# Patient Record
Sex: MALE | Race: ASIAN | Employment: FULL TIME | URBAN - METROPOLITAN AREA
[De-identification: names, ages, dates, MRNs, and addresses within clinical notes are randomized per-mention and may not be internally consistent; named-entity substitution may affect disease eponyms.]

---

## 2017-02-20 ENCOUNTER — HOSPITAL ENCOUNTER (EMERGENCY)
Facility: CLINIC | Age: 37
Discharge: HOME | End: 2017-02-20
Attending: EMERGENCY MEDICINE | Admitting: EMERGENCY MEDICINE
Payer: OTHER MISCELLANEOUS

## 2017-02-20 VITALS
SYSTOLIC BLOOD PRESSURE: 185 MMHG | TEMPERATURE: 99 F | WEIGHT: 197.53 LBS | RESPIRATION RATE: 16 BRPM | BODY MASS INDEX: 30.03 KG/M2 | OXYGEN SATURATION: 100 % | DIASTOLIC BLOOD PRESSURE: 110 MMHG
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
## 2017-02-20 DIAGNOSIS — S81.851A DOG BITE OF RIGHT LOWER LEG, INITIAL ENCOUNTER: ICD-10-CM

## 2017-02-20 DIAGNOSIS — S41.151A DOG BITE OF ARM, RIGHT, INITIAL ENCOUNTER: ICD-10-CM

## 2017-02-20 PROCEDURE — 99282 EMERGENCY DEPT VISIT SF MDM: CPT

## 2017-02-20 ASSESSMENT — ENCOUNTER SYMPTOMS: WOUND: 1

## 2017-02-20 NOTE — ED AVS SNAPSHOT
[facility] Emergency Department    [address]    [facility] [address]    Phone:  [phone]    Fax:  [phone]                                       Valentino Santos   MRN: [id number]    Department:  [facility] Emergency Department   Date of Visit:  2/20/2017           Patient Information     Date Of Birth          1980        Your diagnoses for this visit were:     Dog bite of arm, right, initial encounter     Dog bite of right lower leg, initial encounter        You were seen by Daniel Stevenson MD.      Follow-up Information     Follow up with [facility] Emergency Department.    Specialty:  EMERGENCY MEDICINE    Why:  As needed, If symptoms worsen    Contact information:    [address]  [facility] [address] [phone]        Discharge Instructions         If you can verify dog has been vaccinated, you do not need rabies series. If the dog hasn't been vaccinated, you still don't necessarily need the rabies series if the dog is quarantined and watched. You would have to contact the police for that to happen. You have up to a week to get vaccinated, though sooner is better if it's necessary, though we believe you are very low risk as we discussed.     Keep antibiotic ointment over your wounds. No need for oral antibiotics unless you show signs of infection. (fever, spreading redness, white discharge, increased pain). Return to ED for rabies series if you want, or for signs of infection. Otherwise you may follow-up routinely with your normal doctor.         Dog Bite  A dog bite can cause a wound deep enough to break the skin. In such cases, the wound is cleaned and then closed. Sometimes, the wound is not closed completely. This is so that fluid can drain if the wound becomes infected. In addition to wound care, a tetanus shot may be given, if needed.    Home Care    Wash your hands well with soap and warm water before and  after caring for the wound. This helps lower the risk of infection.    Care for the wound as directed. If a dressing was applied to the wound, be sure to change it as directed.    If the wound bleeds, place a clean, soft cloth on the wound. Then firmly apply pressure until the bleeding stops. This may take up to 5 minutes. Do not release the pressure and look at the wound during this time.    Most wounds heal within 10 days. But an infection can occur even with proper treatment. So be sure to check the wound daily for signs of infection (see below).    Antibiotics may be prescribed. These help prevent or treat infection. If you re given antibiotics, take them as directed. Also be sure to complete the medications.  Rabies Prevention   Rabies is a virus that can be carried in certain animals. These can include domestic animals such as dogs and cats. Pets fully vaccinated against rabies (2 shots) are at very low risk of infection. But because human rabies is almost always fatal, any biting pet should be confined for 10 days as an extra precaution. In general, if there is a risk for rabies, the following steps may need to be taken:    If someone s pet dog has bitten you, it should be kept in a secure area for the next 10 days to watch for signs of illness. (If the pet owner won t allow this, contact your local animal control center.) If the dog becomes ill or dies during that time, contact your local animal control center at once so the animal may be tested for rabies. If the dog stays healthy for the next 10 days, there is no danger of rabies in the animal or you.    If a stray dog bit you, contact your local animal control center. They can give information on capture, quarantine, and animal rabies testing.    If you can t locate the animal that bit you in the next 2 days, and if rabies exists in your region, you may need to receive the rabies vaccine series. Call your health care provider right away. Or, return to the  emergency department promptly.    All animal bites should be reported to the local animal control center. If you were not given a form to fill out, you can report this yourself.  Follow-up care  Follow up with your health care provider, or as directed.   When to seek medical advice  Call your health care provider right away if any of these occur:    Signs of infection:    Spreading redness or warmth from the wound    Increased pain or swelling    Fever of 100.4 F (38 C) or higher, or as directed by your health care provider    Colored fluid or pus draining from the wound    Signs of rabies infection:    Headache    Confusion    Strange behavior    Seizure    Decreased ability to move any body part near the wound    Bleeding that cannot be stopped after 5 minutes of firm pressure    4139-0692 [employer]. [address] [place] [address], [place] [address]. All rights reserved. This information is not intended as a substitute for professional medical care. Always follow your healthcare professional's instructions.          24 Hour Appointment Hotline       To make an appointment at any [facility] (formerly [facility]), call [phone] ([phone]). If you don't have a family doctor or clinic, we will help you find one. [facility] clinics are conveniently located to serve the needs of you and your family.             Review of your medicines      Notice     You have not been prescribed any medications.            Orders Needing Specimen Collection     None      Pending Results     No orders found from 2/18/2017 to 2/21/2017.            Pending Culture Results     No orders found from 2/18/2017 to 2/21/2017.             Test Results from your hospital stay            Clinical Quality Measure: Blood Pressure Screening     Your blood pressure was checked while you were in the emergency department today. The last reading we obtained was  BP: (!) 185/110 . Please read the guidelines below about what these numbers mean and what  "you should do about them.  If your systolic blood pressure (the top number) is less than 120 and your diastolic blood pressure (the bottom number) is less than 80, then your blood pressure is normal. There is nothing more that you need to do about it.  If your systolic blood pressure (the top number) is 120-139 or your diastolic blood pressure (the bottom number) is 80-89, your blood pressure may be higher than it should be. You should have your blood pressure rechecked within a year by a primary care provider.  If your systolic blood pressure (the top number) is 140 or greater or your diastolic blood pressure (the bottom number) is 90 or greater, you may have high blood pressure. High blood pressure is treatable, but if left untreated over time it can put you at risk for heart attack, stroke, or kidney failure. You should have your blood pressure rechecked by a primary care provider within the next 4 weeks.  If your provider in the emergency department today gave you specific instructions to follow-up with your doctor or provider even sooner than that, you should follow that instruction and not wait for up to 4 weeks for your follow-up visit.        Thank you for choosing [facility]       Thank you for choosing [facility] for your care. Our goal is always to provide you with excellent care. Hearing back from our patients is one way we can continue to improve our services. Please take a few minutes to complete the written survey that you may receive in the mail after you visit with us. Thank you!        [employer] Information     [employer] lets you send messages to your doctor, view your test results, renew your prescriptions, schedule appointments and more. To sign up, go to [email or web address] . Click on \"Log in\" on the left side of the screen, which will take you to the Welcome page. Then click on \"Sign up Now\" on the right side of the page.     You will be asked to enter the access code listed below, as well as some " personal information. Please follow the directions to create your username and password.     Your access code is: [email or web address]  Expires: 2017  8:43 PM     Your access code will  in 90 days. If you need help or a new code, please call your [facility] clinic or [phone].        Care EveryWhere ID     This is your Care EveryWhere ID. This could be used by other organizations to access your [facility] medical records  [id number]        After Visit Summary       This is your record. Keep this with you and show to your community pharmacist(s) and doctor(s) at your next visit.

## 2017-02-20 NOTE — ED AVS SNAPSHOT
[facility] Emergency Department    [address]    [facility] [address]    Phone:  [phone]    Fax:  [phone]                                       Valentino Santos   MRN: [id number]    Department:  [facility] Emergency Department   Date of Visit:  2/20/2017           After Visit Summary Signature Page     I have received my discharge instructions, and my questions have been answered. I have discussed any challenges I see with this plan with the nurse or doctor.    ..........................................................................................................................................  Patient/Patient Representative Signature      ..........................................................................................................................................  Patient Representative Print Name and Relationship to Patient    ..................................................               ................................................  Date                                            Time    ..........................................................................................................................................  Reviewed by Signature/Title    ...................................................              ..............................................  Date                                                            Time

## 2017-02-21 NOTE — DISCHARGE INSTRUCTIONS
If you can verify dog has been vaccinated, you do not need rabies series. If the dog hasn't been vaccinated, you still don't necessarily need the rabies series if the dog is quarantined and watched. You would have to contact the police for that to happen. You have up to a week to get vaccinated, though sooner is better if it's necessary, though we believe you are very low risk as we discussed.     Keep antibiotic ointment over your wounds. No need for oral antibiotics unless you show signs of infection. (fever, spreading redness, white discharge, increased pain). Return to ED for rabies series if you want, or for signs of infection. Otherwise you may follow-up routinely with your normal doctor.         Dog Bite  A dog bite can cause a wound deep enough to break the skin. In such cases, the wound is cleaned and then closed. Sometimes, the wound is not closed completely. This is so that fluid can drain if the wound becomes infected. In addition to wound care, a tetanus shot may be given, if needed.    Home Care    Wash your hands well with soap and warm water before and after caring for the wound. This helps lower the risk of infection.    Care for the wound as directed. If a dressing was applied to the wound, be sure to change it as directed.    If the wound bleeds, place a clean, soft cloth on the wound. Then firmly apply pressure until the bleeding stops. This may take up to 5 minutes. Do not release the pressure and look at the wound during this time.    Most wounds heal within 10 days. But an infection can occur even with proper treatment. So be sure to check the wound daily for signs of infection (see below).    Antibiotics may be prescribed. These help prevent or treat infection. If you re given antibiotics, take them as directed. Also be sure to complete the medications.  Rabies Prevention   Rabies is a virus that can be carried in certain animals. These can include domestic animals such as dogs and cats.  Pets fully vaccinated against rabies (2 shots) are at very low risk of infection. But because human rabies is almost always fatal, any biting pet should be confined for 10 days as an extra precaution. In general, if there is a risk for rabies, the following steps may need to be taken:    If someone s pet dog has bitten you, it should be kept in a secure area for the next 10 days to watch for signs of illness. (If the pet owner won t allow this, contact your local animal control center.) If the dog becomes ill or dies during that time, contact your local animal control center at once so the animal may be tested for rabies. If the dog stays healthy for the next 10 days, there is no danger of rabies in the animal or you.    If a stray dog bit you, contact your local animal control center. They can give information on capture, quarantine, and animal rabies testing.    If you can t locate the animal that bit you in the next 2 days, and if rabies exists in your region, you may need to receive the rabies vaccine series. Call your health care provider right away. Or, return to the emergency department promptly.    All animal bites should be reported to the local animal control center. If you were not given a form to fill out, you can report this yourself.  Follow-up care  Follow up with your health care provider, or as directed.   When to seek medical advice  Call your health care provider right away if any of these occur:    Signs of infection:    Spreading redness or warmth from the wound    Increased pain or swelling    Fever of 100.4 F (38 C) or higher, or as directed by your health care provider    Colored fluid or pus draining from the wound    Signs of rabies infection:    Headache    Confusion    Strange behavior    Seizure    Decreased ability to move any body part near the wound    Bleeding that cannot be stopped after 5 minutes of firm pressure    5315-6799 [employer]. [address] [facility],  [place] [address]. All rights reserved. This information is not intended as a substitute for professional medical care. Always follow your healthcare professional's instructions.

## 2017-02-21 NOTE — ED NOTES
Patient arrives to ED due to dog bite . Reports getting bitten  On R arm, did not report incident to PD, went to [facility] and was told to be seen in ED for further evaluation. Bleeding is controlled .

## 2017-02-21 NOTE — ED PROVIDER NOTES
History     Chief Complaint:  Dog Bite      HPI   Valentino Santos is a 37 year old male who presents with a dog bite. 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 When the patient was handing the homework to the student's dad, a dog jumped out from behind the door and bit the patient's right forearm. He also sustained an abrasion to his right leg. The patient states that the student had two dogs and he is unsure which dog bit him. The patient states that he was provided the vaccination records for one of dogs. He was seen at [facility] and was told to come to the ED for further evaluation.         Allergies:  No known drug allergies.     Medications:    The patient is currently on no regular medications.      Past Medical History:    HTN  Depression    Past Surgical History:    History reviewed. No pertinent past surgical history.     Family History:    History reviewed. No pertinent family history.     Social History:  Marital Status:   Presents to the ED alone   Tobacco Use: Former Smoker, quit 2012  Alcohol Use: No     Review of Systems   Skin: Positive for wound.   All other systems reviewed and are negative.    Physical Exam   First Vitals:  BP: (!) 185/110  Heart Rate: 87  Temp: 99  F (37.2  C)  Resp: 16  Weight: 89.6 kg (197 lb 8.5 oz)  SpO2: 100 %    Physical Exam  Nursing note and vitals reviewed.  Constitutional: Cooperative.   HENT:   Mouth/Throat: Moist mucous membranes.   Eyes: EOMI, nonicteric sclera  Cardiovascular: Normal rate, regular rhythm, no murmurs, rubs, or gallops  Pulmonary/Chest: Effort normal and breath sounds normal. No respiratory distress. No wheezes. No rales.   Abdominal: Soft. Nontender, nondistended, no guarding or rigidity. BS present.   Musculoskeletal: Normal range of motion.   Neurological: Alert. Moves all extremities spontaneously.   Skin: Skin is warm and dry. No rash noted. Abrasions to right arm  and right leg. Clothing inspected. No puncture wounds in clothes or skin.   Psychiatric: Normal mood and affect.       Emergency Department Course   Emergency Department Course:  Nursing notes and vitals reviewed.  I performed an exam of the patient as documented above.   Findings and plan explained to the patient. Patient discharged home with instructions regarding supportive care, medications, and reasons to return. The importance of close follow-up was reviewed.    Impression & Plan      Medical Decision Making:  Valentino Santos is a 37 year old male who presents for evaluation of a dog bite to his right forearm and right lower leg.  The workup here in the ED shows no signs of compartment syndrome, significant lacerations, tendon or bone injury.  No signs of foreign body or dog teeth in wound. Actually, dog didn't break pt's clothing and there are no puncture wounds. Dog is known so will have them observe for signs of rabies; no rabies shots indicated from ED.   Will have them observe for signs of infection (pain, redness, warmth, red streaks, etc).   Pt has antibx from urgent care that I recommended that he not start taking unless he shows any signs of infection. He is in stable condition at the time of discharge, indications for return to the ED were discussed as well as follow up. All questions were answered and he is in agreement with the plan.      Diagnosis:    ICD-10-CM    1. Dog bite of arm, right, initial encounter S41.151A     W54.0XXA    2. Dog bite of right lower leg, initial encounter S81.851A     W54.0XXA        Disposition:  discharged to home      I, Judith Tom, am serving as a scribe on 2/20/2017 at 8:03 PM to personally document services performed by Dr. Stevenson based on my observations and the provider's statements to me.    2/20/2017   [facility] EMERGENCY DEPARTMENT       Daniel Stevenson MD  02/25/17 1811

## 2018-12-11 ENCOUNTER — OFFICE VISIT (OUTPATIENT)
Dept: FAMILY MEDICINE | Facility: CLINIC | Age: 38
End: 2018-12-11
Payer: COMMERCIAL

## 2018-12-11 VITALS
RESPIRATION RATE: 12 BRPM | TEMPERATURE: 98.1 F | HEIGHT: 68 IN | WEIGHT: 208 LBS | DIASTOLIC BLOOD PRESSURE: 133 MMHG | BODY MASS INDEX: 31.52 KG/M2 | HEART RATE: 96 BPM | SYSTOLIC BLOOD PRESSURE: 187 MMHG

## 2018-12-11 DIAGNOSIS — I10 BENIGN ESSENTIAL HYPERTENSION: ICD-10-CM

## 2018-12-11 DIAGNOSIS — L72.3 SEBACEOUS CYST: Primary | ICD-10-CM

## 2018-12-11 PROCEDURE — 99204 OFFICE O/P NEW MOD 45 MIN: CPT | Performed by: FAMILY MEDICINE

## 2018-12-11 RX ORDER — LISINOPRIL AND HYDROCHLOROTHIAZIDE 20; 25 MG/1; MG/1
1 TABLET ORAL DAILY
Qty: 30 TABLET | Refills: 0 | Status: SHIPPED | OUTPATIENT
Start: 2018-12-11 | End: 2019-01-09

## 2018-12-11 ASSESSMENT — MIFFLIN-ST. JEOR: SCORE: 1831.95

## 2018-12-11 NOTE — PATIENT INSTRUCTIONS
Patient Education   Follow up with general surgery for cyst removal     Epidermoid Cyst (Sebaceous Cyst), No Infection  An epidermoid cyst (sebaceous cyst) is a term that refers to 2 similar types of cysts: those found in the skin (epidermoid), and those found around hair follicles (pilar).  Some general facts about these cysts:    A cyst is a sac filled with material that is often cheesy, fatty, oily, or fibrous. The material in them can be thick (like cottage cheese) or liquid.    They form slowly under the skin, and can be found on most parts of the body. They are most often found in hairier areas like the scalp, face, upper back, and genitals.    You can usually move them slightly if you try.    They can be smaller than a pea or as large as a few inches.    They are usually not painful, unless they become inflamed or infected.    The area around the cyst may smell bad. If the cyst breaks open, the material inside it often smells bad too.  Causes  Epidermoid cysts are caused when skin (epidermal) cells move under the skin surface, or are covered over by it. These cells continue to multiply, like skin does normally. They then form a wall around themselves (cyst) and secrete normal skin fluids (keratin). This may be developmental. But it often happens because of an injury to the skin.  Epidermoid cysts are often found around hair follicles. These follicles are like cysts, but they have openings. Normal lubricating oils for your hair are sent out through these openings. A cyst occurs when an opening becomes blocked or the site inflamed. This often occurs when there is damage to the hair follicles by a scrape or wound.    Pilar cysts are similar to epidermoid cysts. But they start from a different part of the hair follicle, and are more likely to be on the scalp.  Symptoms    Feeling a lump just beneath the skin    It may or may not be painful    The cyst may or may not smell bad    The cyst may become inflamed or  red    The cyst may leak fluid or thick material  Home care  Epidermoid cysts often go away without any treatment. If your cyst doesn t go away, and it bothers you, it may be drained or removed. If the cyst drains on its own, it may return. Resist the temptation to squeeze, pop, stick a needle in it, or cut it open. This often leads to an infection and scarring. If it gets severely inflamed or infected, you should seek medical care. Be sure to clean the cyst area when bathing or showering. Watch for the signs of infection listed below.  Follow-up care  Follow up with your healthcare provider, or as advised.  When to seek medical advice  Call your healthcare provider right away if any of these occur:    Swelling, redness, or pain    Pus coming from the cyst  Date Last Reviewed: 8/1/2016 2000-2018 [employer]. [address] [place] [address], [place] [address]. All rights reserved. This information is not intended as a substitute for professional medical care. Always follow your healthcare professional's instructions.

## 2018-12-11 NOTE — PROGRESS NOTES
"  SUBJECTIVE:   Valentino Santos is a 38 year old male who presents to clinic today for the following health issues:      Mass -between shoulder blades, right side - 15-20 yrs -   Noticed about 2-3 months ago Per patient he attempted to \"open it up\" by slamming area against his dresser. Wound came open- thinks there was some pus but doesn't know for sure.       BP - elevated.  Pt was on bp meds for 4-5 yrs and then he \"phased them out\" about 3-4 yrs ago. Pt has a bp cuff at home and had a reading of 170/115 and that was one of his low readings. 180/120's.   Has a history of elevated blood pressure- was under control for a while but went off meds because he felt he was in good shape.       Problem list and histories reviewed & adjusted, as indicated.  Additional history: as documented    Patient Active Problem List   Diagnosis     CARDIOVASCULAR SCREENING; LDL GOAL LESS THAN 100     HTN, goal below 140/90     Moderate major depression (H)     Past Surgical History:   Procedure Laterality Date     SOFT TISSUE SURGERY Right 1995    growth removed from the right side of the neck       Social History     Tobacco Use     Smoking status: Former Smoker     Smokeless tobacco: Former User     Types: Chew     Quit date: 9/7/2012     Tobacco comment: chewed several years, vague smoking hx   Substance Use Topics     Alcohol use: Yes     Comment: occasionally     Family History   Problem Relation Age of Onset     Unknown/Adopted Mother            Reviewed and updated as needed this visit by clinical staff  Tobacco  Allergies  Meds  Med Hx  Surg Hx  Fam Hx  Soc Hx      Reviewed and updated as needed this visit by Provider         ROS:  Constitutional, HEENT, cardiovascular, pulmonary, GI, , musculoskeletal, neuro, skin, endocrine and psych systems are negative, except as otherwise noted.    OBJECTIVE:     BP (!) 187/133 (BP Location: Right arm, Patient Position: Chair, Cuff Size: Adult Regular)   Pulse 96   Temp 98.1  F " "(36.7  C) (Oral)   Resp 12   Ht 1.734 m (5' 8.25\")   Wt 94.3 kg (208 lb)   BMI 31.40 kg/m    Body mass index is 31.4 kg/m .  GENERAL: healthy, alert and no distress  EYES: Eyes grossly normal to inspection  NECK: no adenopathy, no asymmetry, masses, or scars and thyroid normal to palpation  RESP: lungs clear to auscultation - no rales, rhonchi or wheezes  CV: regular rate and rhythm, normal S1 S2  MS: no edema  SKIN: ~ 5cm sebaceous cyst mid upper back, overlying skin with hyperpigmentation   PSYCH: mentation appears normal, affect normal/bright    Diagnostic Test Results:  none     ASSESSMENT/PLAN:         1. Sebaceous cyst  - will refer to gen surg for excision   - GENERAL SURG ADULT REFERRAL    2. Benign essential hypertension  - will start on medication and recheck follow up in 1 month. Medication use and side effects discussed with the patient. Patient is in complete understanding and agreement with plan.   - lisinopril-hydrochlorothiazide (PRINZIDE/ZESTORETIC) 20-25 MG tablet; Take 1 tablet by mouth daily  Dispense: 30 tablet; Refill: 0    See Patient Instructions    Gloria Cronin MD  [facility]"

## 2018-12-13 ENCOUNTER — OFFICE VISIT (OUTPATIENT)
Dept: SURGERY | Facility: CLINIC | Age: 38
End: 2018-12-13
Payer: COMMERCIAL

## 2018-12-13 ENCOUNTER — TELEPHONE (OUTPATIENT)
Dept: SURGERY | Facility: CLINIC | Age: 38
End: 2018-12-13

## 2018-12-13 VITALS
BODY MASS INDEX: 31.52 KG/M2 | OXYGEN SATURATION: 100 % | WEIGHT: 208 LBS | SYSTOLIC BLOOD PRESSURE: 154 MMHG | HEART RATE: 66 BPM | HEIGHT: 68 IN | DIASTOLIC BLOOD PRESSURE: 104 MMHG

## 2018-12-13 DIAGNOSIS — R22.2 MASS OF SKIN OF BACK: Primary | ICD-10-CM

## 2018-12-13 PROCEDURE — 99202 OFFICE O/P NEW SF 15 MIN: CPT | Performed by: SURGERY

## 2018-12-13 ASSESSMENT — MIFFLIN-ST. JEOR: SCORE: 1827.98

## 2018-12-13 NOTE — TELEPHONE ENCOUNTER
Type of surgery: EXCISION BACK MASS   Location of surgery: [facility]  Date and time of surgery: 12-19-18 AT 10:20 AM   Surgeon: DR. RASMUSSEN   Pre-Op Appt Date: NA   Post-Op Appt Date: PATIENT TO SCHEDULE    Packet sent out: GIVEN TO PATIENT   Pre-cert/Authorization completed:  Not Applicable  Date: 12-13-18       EXCISION BACK MASS   LOCAL   30 MINS REQ  PA ASSIST RMO if avail    ALW

## 2018-12-13 NOTE — PROGRESS NOTES
HPI      ROS (Review of Systems):     Cardiovascular: Positive for hypertension.          Physical Exam

## 2018-12-13 NOTE — LETTER
"December 13, 2018    Re: Valentino Santos, 1980    HPI:  Valentino presents today for a dermal cyst on his upper mid back for the past 15 years.He had an episode years ago when it became acutely enlarged where he pressed on the site to get it to drain. It was clinically absent until about 2-3 years ago when it reappeared. He has had intermittent drainage from the site in the past and describes it as stinky cheese.  He has no history of  infection.  It is intermittently painful.  It's size is fluctuating.     PE:  BP (!) 154/104   Pulse 66   Ht 1.727 m (5' 8\")   Wt 94.3 kg (208 lb)   SpO2 100%   BMI 31.63 kg/m    General appearance: well-nourished, no apparent distress  Skin: There is a 4 cm dermal cyst on the mid upper back. The overlying skin is  mildly hyperpigmented.  It is non-tender.         Plan:  We will schedule excision at his convenience.  This can easily be done under local anesthesia in the OR.     Kenneth Foster MD      "

## 2018-12-13 NOTE — PROGRESS NOTES
"HPI:  Valentino presents today for a dermal cyst on his upper mid back for the past 15 years.He had an episode years ago when it became acutely enlarged where he pressed on the site to get it to drain. It was clinically absent until about 2-3 years ago when it reappeared. He has had intermittent drainage from the site in the past and describes it as stinky cheese.  He has no history of  infection.  It is intermittently painful.  It's size is fluctuating.    PE:  BP (!) 154/104   Pulse 66   Ht 1.727 m (5' 8\")   Wt 94.3 kg (208 lb)   SpO2 100%   BMI 31.63 kg/m    General appearance: well-nourished, no apparent distress  Skin: There is a 4 cm dermal cyst on the mid upper back. The overlying skin is  mildly hyperpigmented.  It is non-tender.         Plan:  We will schedule excision at his convenience.  This can easily be done under local anesthesia in the OR.    Kenneth Foster MD    Please route or send letter to:  Primary Care Provider (PCP)            "

## 2018-12-19 ENCOUNTER — HOSPITAL ENCOUNTER (OUTPATIENT)
Facility: CLINIC | Age: 38
Discharge: HOME | End: 2018-12-19
Attending: SURGERY | Admitting: SURGERY
Payer: COMMERCIAL

## 2018-12-19 ENCOUNTER — APPOINTMENT (OUTPATIENT)
Dept: SURGERY | Facility: PHYSICIAN GROUP | Age: 38
End: 2018-12-19
Payer: COMMERCIAL

## 2018-12-19 VITALS
HEIGHT: 68 IN | BODY MASS INDEX: 31.37 KG/M2 | SYSTOLIC BLOOD PRESSURE: 144 MMHG | WEIGHT: 207 LBS | HEART RATE: 62 BPM | RESPIRATION RATE: 16 BRPM | OXYGEN SATURATION: 96 % | DIASTOLIC BLOOD PRESSURE: 109 MMHG | TEMPERATURE: 97.7 F

## 2018-12-19 DIAGNOSIS — R22.2 MASS OF SKIN OF BACK: Primary | ICD-10-CM

## 2018-12-19 PROCEDURE — 88304 TISSUE EXAM BY PATHOLOGIST: CPT | Performed by: SURGERY

## 2018-12-19 PROCEDURE — 10060 I&D ABSCESS SIMPLE/SINGLE: CPT | Performed by: SURGERY

## 2018-12-19 PROCEDURE — 40000306 ZZH STATISTIC PRE PROC ASSESS II: Performed by: SURGERY

## 2018-12-19 PROCEDURE — 25000128 H RX IP 250 OP 636: Performed by: SURGERY

## 2018-12-19 PROCEDURE — 71000027 ZZH RECOVERY PHASE 2 EACH 15 MINS: Performed by: SURGERY

## 2018-12-19 PROCEDURE — 36000050 ZZH SURGERY LEVEL 2 1ST 30 MIN: Performed by: SURGERY

## 2018-12-19 PROCEDURE — 36000052 ZZH SURGERY LEVEL 2 EA 15 ADDTL MIN: Performed by: SURGERY

## 2018-12-19 PROCEDURE — 88304 TISSUE EXAM BY PATHOLOGIST: CPT | Mod: 26 | Performed by: SURGERY

## 2018-12-19 PROCEDURE — 25000125 ZZHC RX 250: Performed by: SURGERY

## 2018-12-19 PROCEDURE — 27210794 ZZH OR GENERAL SUPPLY STERILE: Performed by: SURGERY

## 2018-12-19 RX ORDER — MAGNESIUM HYDROXIDE 1200 MG/15ML
LIQUID ORAL PRN
Status: DISCONTINUED | OUTPATIENT
Start: 2018-12-19 | End: 2018-12-19 | Stop reason: HOSPADM

## 2018-12-19 RX ORDER — HYDROCODONE BITARTRATE AND ACETAMINOPHEN 5; 325 MG/1; MG/1
1 TABLET ORAL
Status: DISCONTINUED | OUTPATIENT
Start: 2018-12-19 | End: 2018-12-19 | Stop reason: HOSPADM

## 2018-12-19 RX ORDER — IBUPROFEN 200 MG
400 TABLET ORAL EVERY 4 HOURS PRN
COMMUNITY
End: 2019-04-15

## 2018-12-19 RX ORDER — HYDROCODONE BITARTRATE AND ACETAMINOPHEN 5; 325 MG/1; MG/1
1-2 TABLET ORAL EVERY 4 HOURS PRN
Qty: 8 TABLET | Refills: 0 | Status: SHIPPED | OUTPATIENT
Start: 2018-12-19 | End: 2019-04-15

## 2018-12-19 ASSESSMENT — MIFFLIN-ST. JEOR: SCORE: 1823.45

## 2018-12-19 NOTE — BRIEF OP NOTE
[facility]    Brief Operative Note    Pre-operative diagnosis: back mass  Post-operative diagnosis Infected dermal cyst, back  Procedure: Procedure(s):  excision of back mass  Surgeon: Surgeon(s) and Role:     * Kenneth Gracia MD - Primary  Anesthesia: Local   Estimated blood loss: Less than 10 ml  Drains: None  Specimens:   ID Type Source Tests Collected by Time Destination   A : Back cyst Tissue Back SURGICAL PATHOLOGY EXAM Kenneth Gracia MD 12/19/2018 10:52 AM      Findings:   small 1cm dermal cyst with acute purulent infection, surrounding tissue induration. Debrided and packed with 1 inch plain gauze..  Complications: None.  Implants: None.

## 2018-12-19 NOTE — DISCHARGE INSTRUCTIONS
HOME CARE FOLLOWING DEBRIDEMENT  ZAKIA Howe, JOSH Davies R. O Donnell, J. Shaheen    WOUND CARE:    Remove packing 24 hours after surgery, keep area covered with sterile gauze until spotting stops.    If you have any questions or concerns about your wound or wound care, or would like further instruction, please contact your surgeon's office.  If you have a complicated wound and have been instructed by a Specialized Wound Care Nurse during your hospitalization, and they have given you contact information to reach them, you may also contact them.    ACTIVITY:  Minimize lifting and stretching of area of debridement and the closest extremity (arm or leg) until further recommended by your surgeon.  If your surgery site is in the abdomen, restrict from overhead reaching and/or stretching.    DIET:  No restrictions.  Increased fluid intake is recommended. While taking pain medications, increase dietary fiber or add a fiber supplementation like Metamucil or Citrucel to help prevent constipation - a possible side effect of pain medications.    DISCOMFORT:  Begin taking pain pills before discomfort is severe.  Take the pain medication with some food, when possible, to minimize side effects.  Intermittent use of ice packs may help.  Expect gradual improvement.    RETURN APPOINTMENT:  As recommended by the surgeon.   Office Phone:  [phone]     CONTACT US IF THE FOLLOWING DEVELOPS:   1. A fever that is above 101     2. If there is a large amount of drainage, bleeding, or swelling.   3. Severe pain that is not relieved by your prescription.   4. Drainage that is thick, cloudy, yellow, green or white.   5. Any other questions not answered by  Frequently Asked Questions  sheet.        FREQUENTLY ASKED QUESTIONS:    Q:  How should my incision look?    A:  Normally your incision will appear slightly swollen with light redness directly along the incision itself as it heals.  It may feel like a bump or  ridge as the healing/scarring happens, and over time (3-4 months) this bump or ridge feeling should slowly go away.  In general, clear or pink watery drainage can be normal at first as your incision heals, but should decrease over time.    Q:  How do I know if my incision is infected?  A:  Look at your incision for signs of infection, like redness around the incision spreading to surrounding skin, or drainage of cloudy or foul-smelling drainage.  If you feel warm, check your temperature to see if you are running a fever.    **If any of these things occur, please notify the nurse at our office.  We may need you to come into the office for an incision check.      Q:  How do I take care of my incision?  A:  If you have a dressing in place - Starting the day after surgery, replace the dressing 1-2 times a day until there is no further drainage from the incision.  At that time, a dressing is no longer needed.  Try to minimize tape on the skin if irritation is occurring at the tape sites.  If you have significant irritation from tape on the skin, please call the office to discuss other method of dressing your incision.    Small pieces of tape called  steri-strips  may be present directly overlying your incision; these may be removed 10 days after surgery unless otherwise specified by your surgeon.  If these tapes start to loosen at the ends, you may trim them back until they fall off or are removed.    A:  If you had  Dermabond  tissue glue used as a dressing (this causes your incision to look shiny with a clear covering over it) - This type of dressing wears off with time and does not require more dressings over the top unless it is draining around the glue as it wears off.  Do not apply ointments or lotions over the incisions until the glue has completely worn off.    Q:  There is a piece of tape or a sticky  lead  still on my skin.  Can I remove this?  A:  Sometimes the sticky  leads  used for monitoring during surgery  or for evaluation in the emergency department are not all removed while you are in the hospital.  These sometimes have a tab or metal dot on them.  You can easily remove these on your own, like taking off a band-aid.  If there is a gel substance under the  lead , simply wipe/clean it off with a washcloth or paper towel.      Q:  What can I do to minimize constipation (very hard stools, or lack of stools)?  A:  Stay well hydrated.  Increase your dietary fiber intake or take a fiber supplement -with plenty of water.  Walk around frequently.  You may consider an over-the-counter stool-softener.  Your Pharmacist can assist you with choosing one that is stocked at your pharmacy.  Constipation is also one of the most common side effects of pain medication.  If you are using pain medication, be pro-active and try to PREVENT problems with constipation by taking the steps above BEFORE constipation becomes a problem.    Q:  What do I do if I need more pain medications?  A:  Call the office to receive refills.  Be aware that certain pain meds cannot be called into a pharmacy and actually require a paper prescription.  A change may be made in your pain med as you progress thru your recovery period or if you have side effects to certain meds.    --Pain meds are NOT refilled after 5pm on weekdays, and NOT AT ALL on the weekends, so please look ahead to prevent problems.      Q:  Why am I having a hard time sleeping now that I am at home?  A:  Many medications you receive while you are in the hospital can impact your sleep for a number of days after your surgery/hospitalization.  Decreased level of activity and naps during the day may also make sleeping at night difficult.  Try to minimize day-time naps, and get up frequently during the day to walk around your home during your recovery time.  Sleep aides may be of some help, but are not recommended for long-term use.      Q:  I am having some back discomfort.  What should I  do?  A:  This may be related to certain positioning that was required for your surgery, extended periods of time in bed, or other changes in your overall activity level.  You may try ice, heat, acetaminophen, or ibuprofen to treat this temporarily.  Note that many pain medications have acetaminophen in them and would state this on the prescription bottle.  Be sure not to exceed the maximum of 4000mg per day of acetaminophen.     **If the pain you are having does not resolve, is severe, or is a flare of back pain you have had on other occasions prior to surgery, please contact your primary physician for further recommendations or for an appointment to be examined at their office.    Q:  Why am I having headaches?  A:  Headaches can be caused by many things:  caffeine withdrawal, use of pain meds, dehydration, high blood pressure, lack of sleep, over-activity/exhaustion, flare-up of usual migraine headaches.  If you feel this is related to muscle tension (a band-like feeling around the head, or a pressure at the low-back of the head) you may try ice or heat to this area.  You may need to drink more fluids (try electrolyte drink like Gatorade), rest, or take your usual migraine medications.   **If your headaches do not resolve, worsen, are accompanied by other symptoms, or if your blood pressure is high, please call your primary physician for recommendation and/or examination.    Q:  I am unable to urinate.  What do I do?  A:  A small percentage of people can have difficulty urinating initially after surgery.  This includes being able to urinate only a very small amount at a time and feeling discomfort or pressure in the very low abdomen.  This is called  urinary retention , and is actually an urgent situation.  Proceed to your nearest Emergency department for evaluation (not an Urgent Care Center).  Sometimes the bladder does not work correctly after certain medications you receive during surgery, or related to  certain procedures.  You may need to have a catheter placed until your bladder recovers.  When planning to go to an Emergency department, it may help to call the ER to let them know you are coming in for this problem after a surgery.  This may help you get in quicker to be evaluated.  **If you have symptoms of a urinary tract infection, please contact your primary physician for the proper evaluation and treatment.          If you have other questions, please call the office Monday thru Friday between 8am and 5pm to discuss with the nurse or physician assistant.  #[phone]    There is a surgeon ON CALL on weekday evenings and over the weekend in case of urgent need only, and may be contacted at the same number.    If you are having an emergency, call 911 or proceed to your nearest emergency department.

## 2018-12-20 LAB — COPATH REPORT: NORMAL

## 2018-12-21 ENCOUNTER — TELEPHONE (OUTPATIENT)
Dept: SURGERY | Facility: CLINIC | Age: 38
End: 2018-12-21

## 2018-12-21 NOTE — TELEPHONE ENCOUNTER
Name of caller: Patient    Reason for Call:  Post op questions    Surgeon:  Dr. Foster    Recent Surgery:  Yes.    If yes, when & what type:  excision of back mass 12/19/18    Best phone number to reach pt at is: [phone]    Ok to leave a message with medical info? Yes.    Pharmacy preferred (if calling for a refill): na

## 2018-12-21 NOTE — TELEPHONE ENCOUNTER
GENERAL SURGERY NURSE PHONE TRIAGE   Valentino Santos    MRN# [id number]  AGE:  38 year old  YOB: 1980  [phone] (home)   Surgeon: Dr. Foster  Surgical Assist:  NONE     Surgery type: excision of back mass       Surgery Date: December / 19 / 2018     POD: 2     CHIEF CONCERN:   Infected dermal cyst, back     HISTORY OF PRESENT ILLNESS:   Returned patients call, spoke with wife Lynette. (patient has given verbal consent to communicate)    Patient is saturating 2 dressing every 4 hours.  -Dressing is one guaze pad folded in half.  Drainage is described as watery, light pink.  No odor.    Wound improving per wife.  Wound depth decreasing. Erythremia surrounding also improved.     PLAN:   Increase guaze dressings- may also use sanitary napkin for absorbency.    Recommended to contact the clinic if worsening pain, onset of fever/redness at any incision site, or new drainage from the area. Also recommended to call office at any time if ongoing questions/concerns during recovery. Pt is in agreement with this plan.  Nunu Rico RN on 12/21/2018 at 11:16 AM

## 2018-12-21 NOTE — OP NOTE
Procedure Date: 12/19/2018      PREOPERATIVE DIAGNOSIS:  Infected dermal cyst.      POSTOPERATIVE DIAGNOSIS:  Infected dermal cyst.      PROCEDURE:  Excisional debridement using electrocautery of infected dermal cyst on the mid back.      ANESTHESIA:  Local only.      SURGEON:  Kenneth Foster MD      ASSISTANT:  None.      SPECIMENS:  Back cyst in fragments for routine pathologic handling.      COMPLICATIONS:  None.      INDICATIONS:  Mr. Santos is a 38-year-old gentleman who had a cyst in the mid portion of his back for a number of years which became acutely inflamed after being traumatized.  There was pain and induration of the surrounding skin.  Because of these new symptoms, I offered excisional debridement of the site to investigate for infection and attempt to heal the site as well.  Risks of procedure including infection, bleeding, harm to structures, recurrence of the lesion were reviewed.  The patient verbalized understanding of the above and consented to proceed.      FINDINGS:  There was a small approximately 1 cm disrupted dermal cyst in the subcutaneous tissues of the back, there was a moderate amount of induration within the tissues themselves and also a moderate amount of purulent material extruded forth upon entering the cavity.  This was packed open.      DESCRIPTION OF PROCEDURE:  With the patient in a comfortable prone position on the operative table, the mid back was prepped with chlorhexidine and draped out in the usual sterile surgical fashion.  A timeout was performed confirming the patient, procedure to be done as well as site markings.  He required no antibiotics for this potentially infected case as prophylaxis.  I began by marking approximately 2.5 cm transverse line over the site of maximum induration and anesthetized this and all the surrounding tissues with a mixture of 1% lidocaine and 0.5% Marcaine buffered.  Anesthetic effect was tested, we subsequently created a transverse  incision over the site of the palpable mass and used both sharp and electrocautery dissection to get through the dermis to the subcutaneous fat.  A mild to moderate amount of purulent material extruded forth from the wound as well as a pore just superior to this site.  A small dermal cyst adhesed to the underside of the dermis was encountered.  The fragments of this were removed in multiple pieces and submitted in formalin for routine pathologic handling.  I bluntly probed the base of the wound in all directions and found a cavity to be several centimeters in diameter.  This was then copiously irrigated with sterile saline and cauterized, and the deeper dermal edges were debrided with electrocautery in an attempt to identify any further fragments of the cyst capsule.  The wound was packed with a Ray-Bhavesh for several minutes to obtain hemostasis, the site was subsequently packed with 1-inch plain gauze packing.  Dry sterile dressing was applied over top of this.  The patient tolerated the procedure well and was brought to preop in excellent condition.  All sharps and sponge counts were correct at the conclusion of the case.         JOSE L CHENG MD             D: 2018   T: 2018   MT: KRISTIE      Name:     NEETA RONQUILLO   MRN:      [id number]        Account:        [id number]   :      1980           Procedure Date: 2018      Document: [id number]

## 2019-01-02 ENCOUNTER — OFFICE VISIT (OUTPATIENT)
Dept: SURGERY | Facility: CLINIC | Age: 39
End: 2019-01-02
Payer: COMMERCIAL

## 2019-01-02 VITALS
SYSTOLIC BLOOD PRESSURE: 158 MMHG | DIASTOLIC BLOOD PRESSURE: 90 MMHG | RESPIRATION RATE: 16 BRPM | WEIGHT: 207 LBS | BODY MASS INDEX: 31.37 KG/M2 | HEIGHT: 68 IN | HEART RATE: 70 BPM | OXYGEN SATURATION: 100 %

## 2019-01-02 DIAGNOSIS — Z09 SURGICAL FOLLOWUP VISIT: Primary | ICD-10-CM

## 2019-01-02 PROCEDURE — 99024 POSTOP FOLLOW-UP VISIT: CPT | Performed by: PHYSICIAN ASSISTANT

## 2019-01-02 ASSESSMENT — MIFFLIN-ST. JEOR: SCORE: 1823.45

## 2019-01-02 NOTE — PATIENT INSTRUCTIONS
Patient to follow up with Primary Care provider regarding elevated blood pressure.    Emilie Salinas CMA

## 2019-01-02 NOTE — PROGRESS NOTES
1/2/2019    [facility] Note     Subjective:  Valentino Santos is here for his first postoperative visit. He underwent excision of upper back mass by Dr. Foster on 12/19/2018. Today he  tells me he has been feeling well since surgery. He currently does not require narcotic pain medications. He reports continued mild pink drainage from surgical site.  He removed packing 24 hours after surgery as instructed, and has been covering site with dry gauze dressing.    Objective:  Wound - No erythema surrounding wound.  + mild induration.  Dry, scabbed wound bed with minimal serosanguinous drainage on dressing.  Wound measures 2 cm X 1 cm, and 0.5 cm deep.  Bacitracin was applied to wound bed and covered with dry gauze dressing.    Assessment:  S/p excision of upper back mass. The pathology confirms benign ruptured inclusion cyst.    Plan:  Recommend applying bacitracin daily to wound bed until wound fills in with tissue.    RTC PRN      Sophia Wing PA-C      Please route or send letter to:  Primary Care Provider (PCP)

## 2019-01-03 ENCOUNTER — TELEPHONE (OUTPATIENT)
Dept: FAMILY MEDICINE | Facility: CLINIC | Age: 39
End: 2019-01-03

## 2019-01-03 NOTE — TELEPHONE ENCOUNTER
Panel Management Review      Patient has the following on his problem list:     Depression / Dysthymia review    Measure:  Needs PHQ-9 score of 4 or less during index window.  Administer PHQ-9 and if score is 5 or more, send encounter to provider for next steps.    5 - 7 month window range: n/a    PHQ-9 SCORE 4/18/2013 2/6/2014   PHQ-9 Total Score 22 1       If PHQ-9 recheck is 5 or more, route to provider for next steps.    Patient is due for:  None    Hypertension   Last three blood pressure readings:  BP Readings from Last 3 Encounters:   01/02/19 158/90   12/19/18 (!) 144/109   12/13/18 (!) 154/104     Blood pressure: FAILED    HTN Guidelines:  Age 18-59 BP range:  Less than 140/90  Age 60-85 with Diabetes:  Less than 140/90  Age 60-85 without Diabetes:  less than 150/90      Composite cancer screening  Chart review shows that this patient is due/due soon for the following None  Summary:    Patient is due/failing the following:   BP CHECK    Action needed:   Patient needs nurse only appointment.    Type of outreach:    future apt scheduled    Questions for provider review:    None                                                                                                                                    STEFANIA Perry       Chart routed to Care Team .

## 2019-01-09 ENCOUNTER — OFFICE VISIT (OUTPATIENT)
Dept: FAMILY MEDICINE | Facility: CLINIC | Age: 39
End: 2019-01-09
Payer: COMMERCIAL

## 2019-01-09 VITALS
DIASTOLIC BLOOD PRESSURE: 80 MMHG | OXYGEN SATURATION: 99 % | BODY MASS INDEX: 31.78 KG/M2 | SYSTOLIC BLOOD PRESSURE: 138 MMHG | WEIGHT: 209 LBS | HEART RATE: 69 BPM | TEMPERATURE: 98 F

## 2019-01-09 DIAGNOSIS — I10 BENIGN ESSENTIAL HYPERTENSION: Primary | ICD-10-CM

## 2019-01-09 DIAGNOSIS — E66.811 OBESITY (BMI 30.0-34.9): ICD-10-CM

## 2019-01-09 PROCEDURE — 99214 OFFICE O/P EST MOD 30 MIN: CPT | Performed by: FAMILY MEDICINE

## 2019-01-09 RX ORDER — LISINOPRIL AND HYDROCHLOROTHIAZIDE 20; 25 MG/1; MG/1
1 TABLET ORAL DAILY
Qty: 90 TABLET | Refills: 0 | Status: SHIPPED | OUTPATIENT
Start: 2019-01-09 | End: 2019-04-10

## 2019-01-09 ASSESSMENT — ANXIETY QUESTIONNAIRES
2. NOT BEING ABLE TO STOP OR CONTROL WORRYING: SEVERAL DAYS
7. FEELING AFRAID AS IF SOMETHING AWFUL MIGHT HAPPEN: NOT AT ALL
7. FEELING AFRAID AS IF SOMETHING AWFUL MIGHT HAPPEN: NOT AT ALL
GAD7 TOTAL SCORE: 6
6. BECOMING EASILY ANNOYED OR IRRITABLE: SEVERAL DAYS
3. WORRYING TOO MUCH ABOUT DIFFERENT THINGS: SEVERAL DAYS
GAD7 TOTAL SCORE: 6
4. TROUBLE RELAXING: SEVERAL DAYS
5. BEING SO RESTLESS THAT IT IS HARD TO SIT STILL: SEVERAL DAYS
GAD7 TOTAL SCORE: 6
1. FEELING NERVOUS, ANXIOUS, OR ON EDGE: SEVERAL DAYS

## 2019-01-09 ASSESSMENT — PATIENT HEALTH QUESTIONNAIRE - PHQ9
SUM OF ALL RESPONSES TO PHQ QUESTIONS 1-9: 4
10. IF YOU CHECKED OFF ANY PROBLEMS, HOW DIFFICULT HAVE THESE PROBLEMS MADE IT FOR YOU TO DO YOUR WORK, TAKE CARE OF THINGS AT HOME, OR GET ALONG WITH OTHER PEOPLE: NOT DIFFICULT AT ALL
SUM OF ALL RESPONSES TO PHQ QUESTIONS 1-9: 4

## 2019-01-09 NOTE — PROGRESS NOTES
SUBJECTIVE:   Valentino Santos is a 38 year old male who presents to clinic today for the following health issues:      Hypertension Follow-up  Answers for HPI/ROS submitted by the patient on 1/9/2019   Chronic problems general questions HPI Form  Outpatient blood pressures: are not being checked  Dietary sodium intake:: Not monitoring salt intake  If you checked off any problems, how difficult have these problems made it for you to do your work, take care of things at home, or get along with other people?: Not difficult at all  PHQ9 TOTAL SCORE: 4  NATALI 7 TOTAL SCORE: 6        Amount of exercise or physical activity: 6-7 days/week for an average of 30-45 minutes    Problems taking medications regularly: No    Medication side effects: none    Diet: regular (no restrictions)    Wt Readings from Last 4 Encounters:   01/09/19 94.8 kg (209 lb)   01/02/19 93.9 kg (207 lb)   12/19/18 93.9 kg (207 lb)   12/13/18 94.3 kg (208 lb)           Problem list and histories reviewed & adjusted, as indicated.  Additional history: as documented    Patient Active Problem List   Diagnosis     CARDIOVASCULAR SCREENING; LDL GOAL LESS THAN 100     HTN, goal below 140/90     Moderate major depression (H)     Past Surgical History:   Procedure Laterality Date     EXCISE MASS TRUNK N/A 12/19/2018    Procedure: excision of back mass;  Surgeon: Kenneth Gracia MD;  Location: [facility] OR     SOFT TISSUE SURGERY Right 1995    growth removed from the right side of the neck       Social History     Tobacco Use     Smoking status: Former Smoker     Smokeless tobacco: Former User     Types: Chew     Quit date: 9/7/2012     Tobacco comment: chewed several years, vague smoking hx   Substance Use Topics     Alcohol use: Yes     Comment: occasionally     Family History   Problem Relation Age of Onset     Unknown/Adopted Mother            Reviewed and updated as needed this visit by clinical staff  Tobacco  Allergies  Meds  Med Hx  Surg Hx  Fam Hx   Soc Hx      Reviewed and updated as needed this visit by Provider         ROS:  Constitutional, cardiovascular, pulmonary systems are negative, except as otherwise noted.    OBJECTIVE:     /80 (BP Location: Right arm, Patient Position: Sitting, Cuff Size: Adult Large)   Pulse 69   Temp 98  F (36.7  C) (Oral)   Wt 94.8 kg (209 lb)   SpO2 99%   BMI 31.78 kg/m    Body mass index is 31.78 kg/m .  GENERAL: healthy, alert and no distress  RESP: lungs clear to auscultation - no rales, rhonchi or wheezes  CV: regular rate and rhythm, normal S1 S2  MS: no edema    Diagnostic Test Results:  none     ASSESSMENT/PLAN:       1. Benign essential hypertension  - BP improving. Will continue on current regimen and recheck in 3 months or sooner if needed  - lisinopril-hydrochlorothiazide (PRINZIDE/ZESTORETIC) 20-25 MG tablet; Take 1 tablet by mouth daily  Dispense: 90 tablet; Refill: 0    2. Obesity (BMI 30.0-34.9)  - diet and exercise discussed      See Patient Instructions    Gloria Cronin MD  [facility]    Answers for HPI/ROS submitted by the patient on 1/9/2019   Chronic problems general questions HPI Form  Outpatient blood pressures: are not being checked  Dietary sodium intake:: Not monitoring salt intake  If you checked off any problems, how difficult have these problems made it for you to do your work, take care of things at home, or get along with other people?: Not difficult at all  PHQ9 TOTAL SCORE: 4  NATALI 7 TOTAL SCORE: 6

## 2019-01-10 ASSESSMENT — PATIENT HEALTH QUESTIONNAIRE - PHQ9: SUM OF ALL RESPONSES TO PHQ QUESTIONS 1-9: 4

## 2019-01-10 ASSESSMENT — ANXIETY QUESTIONNAIRES: GAD7 TOTAL SCORE: 6

## 2019-01-18 ENCOUNTER — TELEPHONE (OUTPATIENT)
Dept: FAMILY MEDICINE | Facility: CLINIC | Age: 39
End: 2019-01-18

## 2019-01-18 NOTE — TELEPHONE ENCOUNTER
Panel Management Review      Patient has the following on his problem list:     Hypertension   Last three blood pressure readings:  BP Readings from Last 3 Encounters:   01/09/19 138/80   01/02/19 158/90   12/19/18 (!) 144/109     Blood pressure: FAILED    HTN Guidelines:  Age 18-59 BP range:  Less than 140/90  Age 60-85 with Diabetes:  Less than 140/90  Age 60-85 without Diabetes:  less than 150/90      Composite cancer screening  Chart review shows that this patient is due/due soon for the following None  Summary:    Patient is due/failing the following:   BP CHECK    Action needed:   None at this time, had normal BP recently      Type of outreach:    none at this time    Questions for provider review:    None                                                                                                                                    Emily Ruiz/SHAHBAZ  [place]---[facility]       Chart routed to none .

## 2019-04-15 ENCOUNTER — OFFICE VISIT (OUTPATIENT)
Dept: FAMILY MEDICINE | Facility: CLINIC | Age: 39
End: 2019-04-15
Payer: COMMERCIAL

## 2019-04-15 VITALS
DIASTOLIC BLOOD PRESSURE: 98 MMHG | SYSTOLIC BLOOD PRESSURE: 152 MMHG | RESPIRATION RATE: 16 BRPM | HEART RATE: 91 BPM | TEMPERATURE: 98.1 F | BODY MASS INDEX: 32.43 KG/M2 | WEIGHT: 214 LBS | HEIGHT: 68 IN

## 2019-04-15 DIAGNOSIS — I10 BENIGN ESSENTIAL HYPERTENSION: Primary | ICD-10-CM

## 2019-04-15 DIAGNOSIS — E66.811 OBESITY (BMI 30.0-34.9): ICD-10-CM

## 2019-04-15 PROCEDURE — 99214 OFFICE O/P EST MOD 30 MIN: CPT | Performed by: FAMILY MEDICINE

## 2019-04-15 RX ORDER — LISINOPRIL AND HYDROCHLOROTHIAZIDE 20; 25 MG/1; MG/1
2 TABLET ORAL DAILY
Qty: 30 TABLET | Refills: 0 | COMMUNITY
Start: 2019-04-15 | End: 2019-07-22

## 2019-04-15 ASSESSMENT — MIFFLIN-ST. JEOR: SCORE: 1854.17

## 2019-04-15 NOTE — PROGRESS NOTES
SUBJECTIVE:   Valentino Santos is a 39 year old male who presents to clinic today for the following health issues:      Hypertension     Hypertension:     Outpatient blood pressures:  Are not being checked    Dietary sodium intake::  Not monitoring salt intake  History of Present Illness     Hypertension:     Outpatient blood pressures:  Are not being checked    Dietary sodium intake::  Not monitoring salt intake    Wt Readings from Last 4 Encounters:   04/15/19 97.1 kg (214 lb)   01/09/19 94.8 kg (209 lb)   01/02/19 93.9 kg (207 lb)   12/19/18 93.9 kg (207 lb)     Patient admits since last visit he has not been doing well with his diet. Some increased stress at work as well. Exercising some but not consistently but definitely better than before.       Additional history: as documented    Reviewed and updated as needed this visit by clinical staff  Tobacco  Allergies  Meds         Reviewed and updated as needed this visit by Provider      Patient Active Problem List   Diagnosis     CARDIOVASCULAR SCREENING; LDL GOAL LESS THAN 100     HTN, goal below 140/90     Moderate major depression (H)     Obesity (BMI 30.0-34.9)     Benign essential hypertension     Past Surgical History:   Procedure Laterality Date     EXCISE MASS TRUNK N/A 12/19/2018    Procedure: excision of back mass;  Surgeon: Kenneth Gracia MD;  Location: [facility] OR     SOFT TISSUE SURGERY Right 1995    growth removed from the right side of the neck       Social History     Tobacco Use     Smoking status: Former Smoker     Smokeless tobacco: Former User     Types: Chew     Quit date: 9/7/2012     Tobacco comment: chewed several years, vague smoking hx   Substance Use Topics     Alcohol use: Yes     Comment: occasionally     Family History   Problem Relation Age of Onset     Unknown/Adopted Mother            ROS:  Constitutional, cardiovascular, pulmonary, gi  systems are negative, except as otherwise noted.    OBJECTIVE:     BP (!) 170/106 (BP  "Location: Right arm, Patient Position: Chair, Cuff Size: Adult Large)   Pulse 91   Temp 98.1  F (36.7  C) (Oral)   Resp 16   Ht 1.734 m (5' 8.25\")   Wt 97.1 kg (214 lb)   BMI 32.30 kg/m    Body mass index is 32.3 kg/m .  GENERAL: healthy, alert and no distress  RESP: lungs clear to auscultation - no rales, rhonchi or wheezes  CV: regular rate and rhythm, normal S1 S2  PSYCH: mentation appears normal, affect normal/bright    Diagnostic Test Results:  none     ASSESSMENT/PLAN:       1. Benign essential hypertension  - will increase dose to 2 tablets daily from 1 tablet and recheck in 1 month or sooner if needed  - lisinopril-hydrochlorothiazide (PRINZIDE/ZESTORETIC) 20-25 MG tablet; Take 2 tablets by mouth daily  Dispense: 30 tablet; Refill: 0    2. Obesity (BMI 30.0-34.9)  - weight increasing from last visit. diet and exercise discussed.       See Patient Instructions    Gloria Cronin MD  [facility]  "

## 2019-10-01 ENCOUNTER — HEALTH MAINTENANCE LETTER (OUTPATIENT)
Age: 39
End: 2019-10-01

## 2019-10-08 ENCOUNTER — OFFICE VISIT (OUTPATIENT)
Dept: FAMILY MEDICINE | Facility: CLINIC | Age: 39
End: 2019-10-08
Payer: COMMERCIAL

## 2019-10-08 VITALS
RESPIRATION RATE: 16 BRPM | TEMPERATURE: 98.1 F | HEIGHT: 68 IN | SYSTOLIC BLOOD PRESSURE: 138 MMHG | DIASTOLIC BLOOD PRESSURE: 78 MMHG | OXYGEN SATURATION: 98 % | HEART RATE: 82 BPM | WEIGHT: 218.6 LBS | BODY MASS INDEX: 33.13 KG/M2

## 2019-10-08 DIAGNOSIS — E66.811 OBESITY (BMI 30.0-34.9): ICD-10-CM

## 2019-10-08 DIAGNOSIS — I10 BENIGN ESSENTIAL HYPERTENSION: Primary | ICD-10-CM

## 2019-10-08 DIAGNOSIS — Z23 NEED FOR PROPHYLACTIC VACCINATION AND INOCULATION AGAINST INFLUENZA: ICD-10-CM

## 2019-10-08 PROCEDURE — 90686 IIV4 VACC NO PRSV 0.5 ML IM: CPT | Performed by: FAMILY MEDICINE

## 2019-10-08 PROCEDURE — 99213 OFFICE O/P EST LOW 20 MIN: CPT | Mod: 25 | Performed by: FAMILY MEDICINE

## 2019-10-08 PROCEDURE — 90471 IMMUNIZATION ADMIN: CPT | Performed by: FAMILY MEDICINE

## 2019-10-08 ASSESSMENT — ANXIETY QUESTIONNAIRES
7. FEELING AFRAID AS IF SOMETHING AWFUL MIGHT HAPPEN: NOT AT ALL
GAD7 TOTAL SCORE: 1
GAD7 TOTAL SCORE: 1
7. FEELING AFRAID AS IF SOMETHING AWFUL MIGHT HAPPEN: NOT AT ALL
4. TROUBLE RELAXING: NOT AT ALL
GAD7 TOTAL SCORE: 1
3. WORRYING TOO MUCH ABOUT DIFFERENT THINGS: NOT AT ALL
1. FEELING NERVOUS, ANXIOUS, OR ON EDGE: SEVERAL DAYS
5. BEING SO RESTLESS THAT IT IS HARD TO SIT STILL: NOT AT ALL
2. NOT BEING ABLE TO STOP OR CONTROL WORRYING: NOT AT ALL
6. BECOMING EASILY ANNOYED OR IRRITABLE: NOT AT ALL

## 2019-10-08 ASSESSMENT — PATIENT HEALTH QUESTIONNAIRE - PHQ9
10. IF YOU CHECKED OFF ANY PROBLEMS, HOW DIFFICULT HAVE THESE PROBLEMS MADE IT FOR YOU TO DO YOUR WORK, TAKE CARE OF THINGS AT HOME, OR GET ALONG WITH OTHER PEOPLE: NOT DIFFICULT AT ALL
SUM OF ALL RESPONSES TO PHQ QUESTIONS 1-9: 2
SUM OF ALL RESPONSES TO PHQ QUESTIONS 1-9: 2

## 2019-10-08 ASSESSMENT — MIFFLIN-ST. JEOR: SCORE: 1875.93

## 2019-10-08 ASSESSMENT — PAIN SCALES - GENERAL: PAINLEVEL: NO PAIN (0)

## 2019-10-08 NOTE — PROGRESS NOTES
Subjective     Valentino Santos is a 39 year old male who presents to clinic today for the following health issues:    History of Present Illness        Hypertension: He presents for follow up of hypertension.  He does check blood pressure  regularly outside of the clinic. Outside blood pressures have been over 140/90. He does not follow a low salt diet.     He eats 2-3 servings of fruits and vegetables daily.He consumes 2 sweetened beverage(s) daily.  He is taking medications regularly.     Hypertension Follow-up      Do you check your blood pressure regularly outside of the clinic? Yes     Are you following a low salt diet? No    Are your blood pressures ever more than 140 on the top number (systolic) OR more   than 90 on the bottom number (diastolic), for example 140/90? No      How many servings of fruits and vegetables do you eat daily?  2-3    On average, how many sweetened beverages do you drink each day (soda, juice, sweet tea, etc)?   0    How many days per week do you miss taking your medication? 0    Wt Readings from Last 4 Encounters:   10/08/19 99.2 kg (218 lb 9.6 oz)   04/15/19 97.1 kg (214 lb)   01/09/19 94.8 kg (209 lb)   01/02/19 93.9 kg (207 lb)     BP readings at home ranges from 100-138/65-91 over the summer.  Per patient he has also put on a but of weight since he started weight lifting over the summer.   Patient was prescribed lisinopril/ hydrochlorothiazide 2 tabs daily but he is currently taking 1 tab because he experiences muscle cramps with 2 tabs.             Patient Active Problem List   Diagnosis     CARDIOVASCULAR SCREENING; LDL GOAL LESS THAN 100     HTN, goal below 140/90     Moderate major depression (H)     Obesity (BMI 30.0-34.9)     Benign essential hypertension     Past Surgical History:   Procedure Laterality Date     EXCISE MASS TRUNK N/A 12/19/2018    Procedure: excision of back mass;  Surgeon: Kenneth Gracia MD;  Location: [facility] OR     SOFT TISSUE SURGERY Right 1995     "growth removed from the right side of the neck       Social History     Tobacco Use     Smoking status: Former Smoker     Smokeless tobacco: Former User     Types: Chew     Quit date: 9/7/2012     Tobacco comment: chewed several years, vague smoking hx   Substance Use Topics     Alcohol use: Yes     Comment: occasionally     Family History   Problem Relation Age of Onset     Unknown/Adopted Mother            Reviewed and updated as needed this visit by Provider  Tobacco  Allergies  Meds  Problems  Med Hx  Surg Hx  Fam Hx         Review of Systems   ROS COMP: Constitutional, HEENT, cardiovascular, pulmonary, gi, msk systems are negative, except as otherwise noted.      Objective    /78 (BP Location: Right arm, Patient Position: Sitting, Cuff Size: Adult Large)   Pulse 82   Temp 98.1  F (36.7  C) (Oral)   Resp 16   Ht 1.735 m (5' 8.31\")   Wt 99.2 kg (218 lb 9.6 oz)   SpO2 98%   BMI 32.94 kg/m    Body mass index is 32.94 kg/m .  Physical Exam   GENERAL: healthy, alert and no distress  RESP: lungs clear to auscultation - no rales, rhonchi or wheezes  CV: regular rate and rhythm, normal S1 S2, no S3 or S4, no murmur, click or rub  PSYCH: mentation appears normal, affect normal/bright    Diagnostic Test Results:  Labs reviewed in Epic  none         Assessment & Plan     1. Benign essential hypertension  - ok to take 1 tablet daily of lisinopril/HCTZ. Patient to check in via [facility] with BP readings.   - ETARGETt BP Flowsheet    2. Obesity (BMI 30.0-34.9)  - diet and exercise discussed    3. Need for prophylactic vaccination and inoculation against influenza  - INFLUENZA VACCINE IM > 6 MONTHS VALENT IIV4 [50749]  - Vaccine Administration, Initial [12646]           See Patient Instructions    Return in about 3 months (around 1/8/2020) for medication recheck.    Gloria Cronin MD  [facility]    Answers for HPI/ROS submitted by the patient on 10/8/2019   Chronic problems " general questions HPI Form  If you checked off any problems, how difficult have these problems made it for you to do your work, take care of things at home, or get along with other people?: Not difficult at all  PHQ9 TOTAL SCORE: 2  NATALI 7 TOTAL SCORE: 1

## 2019-10-09 ASSESSMENT — ANXIETY QUESTIONNAIRES: GAD7 TOTAL SCORE: 1

## 2020-02-11 NOTE — PROGRESS NOTES
Pre-Visit Planning     Future Appointments   Date Time Provider Department Center   2/14/2020  3:40 PM Gloria Cronin MD [facility]     Arrival Time for this Appointment:  3:10 PM   Appointment Notes for this encounter:   Blood pressure med check    Questionnaires Reviewed/Assigned  Additional questionnaires assigned      Patient preferred phone number: [phone]    Unable to reach. Left voicemail. Advised patient to call clinic back at [phone]. Advised patient of [employer] E-check in for questionnaires.     John Hauser, EMT/Clinic Health Guide

## 2020-02-14 ENCOUNTER — OFFICE VISIT (OUTPATIENT)
Dept: FAMILY MEDICINE | Facility: CLINIC | Age: 40
End: 2020-02-14
Payer: COMMERCIAL

## 2020-02-14 VITALS
OXYGEN SATURATION: 99 % | WEIGHT: 216 LBS | HEART RATE: 80 BPM | RESPIRATION RATE: 20 BRPM | TEMPERATURE: 98.4 F | HEIGHT: 68 IN | BODY MASS INDEX: 32.74 KG/M2 | SYSTOLIC BLOOD PRESSURE: 124 MMHG | DIASTOLIC BLOOD PRESSURE: 102 MMHG

## 2020-02-14 DIAGNOSIS — I10 BENIGN ESSENTIAL HYPERTENSION: Primary | ICD-10-CM

## 2020-02-14 DIAGNOSIS — E66.811 OBESITY (BMI 30.0-34.9): ICD-10-CM

## 2020-02-14 DIAGNOSIS — R53.83 FATIGUE, UNSPECIFIED TYPE: ICD-10-CM

## 2020-02-14 DIAGNOSIS — Z13.6 CARDIOVASCULAR SCREENING; LDL GOAL LESS THAN 100: ICD-10-CM

## 2020-02-14 PROCEDURE — 99214 OFFICE O/P EST MOD 30 MIN: CPT | Performed by: FAMILY MEDICINE

## 2020-02-14 RX ORDER — LISINOPRIL AND HYDROCHLOROTHIAZIDE 20; 25 MG/1; MG/1
2 TABLET ORAL DAILY
Qty: 180 TABLET | Refills: 0 | Status: SHIPPED | OUTPATIENT
Start: 2020-02-14 | End: 2020-05-19

## 2020-02-14 ASSESSMENT — ANXIETY QUESTIONNAIRES
1. FEELING NERVOUS, ANXIOUS, OR ON EDGE: SEVERAL DAYS
5. BEING SO RESTLESS THAT IT IS HARD TO SIT STILL: NOT AT ALL
6. BECOMING EASILY ANNOYED OR IRRITABLE: NOT AT ALL
7. FEELING AFRAID AS IF SOMETHING AWFUL MIGHT HAPPEN: NOT AT ALL
7. FEELING AFRAID AS IF SOMETHING AWFUL MIGHT HAPPEN: NOT AT ALL
GAD7 TOTAL SCORE: 4
GAD7 TOTAL SCORE: 4
2. NOT BEING ABLE TO STOP OR CONTROL WORRYING: SEVERAL DAYS
3. WORRYING TOO MUCH ABOUT DIFFERENT THINGS: SEVERAL DAYS
4. TROUBLE RELAXING: SEVERAL DAYS
GAD7 TOTAL SCORE: 4

## 2020-02-14 ASSESSMENT — PATIENT HEALTH QUESTIONNAIRE - PHQ9
SUM OF ALL RESPONSES TO PHQ QUESTIONS 1-9: 6
SUM OF ALL RESPONSES TO PHQ QUESTIONS 1-9: 6
10. IF YOU CHECKED OFF ANY PROBLEMS, HOW DIFFICULT HAVE THESE PROBLEMS MADE IT FOR YOU TO DO YOUR WORK, TAKE CARE OF THINGS AT HOME, OR GET ALONG WITH OTHER PEOPLE: NOT DIFFICULT AT ALL

## 2020-02-14 ASSESSMENT — MIFFLIN-ST. JEOR: SCORE: 1854.27

## 2020-02-14 NOTE — PROGRESS NOTES
Subjective     Valentino Santos is a 40 year old male who presents to clinic today for the following health issues:      Hypertension: He presents for follow up of hypertension.  He does check blood pressure  regularly outside of the clinic. Outside blood pressures have been over 140/90. He does not follow a low salt diet.   History of Present Illness        Hypertension: He presents for follow up of hypertension.  He does check blood pressure  regularly outside of the clinic. Outside blood pressures have been over 140/90. He does not follow a low salt diet.     He eats 2-3 servings of fruits and vegetables daily.He consumes 0 sweetened beverage(s) daily.He exercises with enough effort to increase his heart rate 30 to 60 minutes per day.  He exercises with enough effort to increase his heart rate 4 days per week.   He is taking medications regularly.       Per patient he has noticed his blood pressure is slightly higher than it has been in the past. Has gone as high as 150's/100. He does admit he has only been taking 1 tablet of his medication rather than two tablets.   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   He has also been noticing more fatigue. Notes this is usually worse in the winter months.     Wt Readings from Last 4 Encounters:   02/14/20 98 kg (216 lb)   10/08/19 99.2 kg (218 lb 9.6 oz)   04/15/19 97.1 kg (214 lb)   01/09/19 94.8 kg (209 lb)       Patient Active Problem List   Diagnosis     CARDIOVASCULAR SCREENING; LDL GOAL LESS THAN 100     HTN, goal below 140/90     Moderate major depression (H)     Obesity (BMI 30.0-34.9)     Benign essential hypertension     Past Surgical History:   Procedure Laterality Date     EXCISE MASS TRUNK N/A 12/19/2018    Procedure: excision of back mass;  Surgeon: Kenneth Gracia MD;  Location: [facility] OR     SOFT TISSUE SURGERY Right 1995    growth removed from the right side of the neck       Social History  "    Tobacco Use     Smoking status: Former Smoker     Smokeless tobacco: Former User     Types: Chew     Quit date: 9/7/2012     Tobacco comment: chewed several years, vague smoking hx   Substance Use Topics     Alcohol use: Yes     Comment: occasionally     Family History   Problem Relation Age of Onset     Unknown/Adopted Mother            Reviewed and updated as needed this visit by Provider  Meds         Review of Systems   ROS COMP: Constitutional, HEENT, cardiovascular, pulmonary, gi and gu systems are negative, except as otherwise noted.      Objective    BP (!) 124/102 (BP Location: Right arm, Patient Position: Chair, Cuff Size: Adult Large)   Pulse 80   Temp 98.4  F (36.9  C) (Oral)   Resp 20   Ht 1.727 m (5' 8\")   Wt 98 kg (216 lb)   SpO2 99%   BMI 32.84 kg/m    Body mass index is 32.84 kg/m .  Physical Exam   GENERAL: healthy, alert and no distress  RESP: lungs clear to auscultation - no rales, rhonchi or wheezes  CV: regular rate and rhythm, normal S1 S2, no S3 or S4, no murmur, click or rub, no peripheral edema and peripheral pulses strong  MS: no edema  PSYCH: mentation appears normal and anxious    Diagnostic Test Results:  Labs reviewed in Epic  none         Assessment & Plan     1. Benign essential hypertension  - recommend taking 2 tablets rather than 1 tablet daily. RTC in 1 month for recheck  - Comprehensive metabolic panel; Future  - lisinopril-hydrochlorothiazide (ZESTORETIC) 20-25 MG tablet; Take 2 tablets by mouth daily  Dispense: 180 tablet; Refill: 0    2. Obesity (BMI 30.0-34.9)  - diet and exercise reviewed     3. CARDIOVASCULAR SCREENING; LDL GOAL LESS THAN 100  - Lipid panel reflex to direct LDL Fasting; Future    4. Fatigue, unspecified type  - **TSH with free T4 reflex FUTURE anytime; Future  - **Vitamin D Deficiency FUTURE anytime; Future  - **A1C FUTURE anytime; Future     BMI:   Estimated body mass index is 32.84 kg/m  as calculated from the following:    Height as of this " "encounter: 1.727 m (5' 8\").    Weight as of this encounter: 98 kg (216 lb).   Weight management plan: Discussed healthy diet and exercise guidelines        See Patient Instructions    Return in about 1 month (around 3/14/2020) for medication recheck.    Gloria Cronin MD  [facility]    Answers for HPI/ROS submitted by the patient on 2/14/2020   Chronic problems general questions HPI Form  If you checked off any problems, how difficult have these problems made it for you to do your work, take care of things at home, or get along with other people?: Not difficult at all  PHQ9 TOTAL SCORE: 6  NATALI 7 TOTAL SCORE: 4    "

## 2020-02-15 ASSESSMENT — ANXIETY QUESTIONNAIRES: GAD7 TOTAL SCORE: 4

## 2020-03-07 DIAGNOSIS — I10 BENIGN ESSENTIAL HYPERTENSION: ICD-10-CM

## 2020-03-07 DIAGNOSIS — Z13.6 CARDIOVASCULAR SCREENING; LDL GOAL LESS THAN 100: ICD-10-CM

## 2020-03-07 DIAGNOSIS — R53.83 FATIGUE, UNSPECIFIED TYPE: ICD-10-CM

## 2020-03-07 LAB
ALBUMIN SERPL-MCNC: 4 G/DL (ref 3.4–5)
ALP SERPL-CCNC: 63 U/L (ref 40–150)
ALT SERPL W P-5'-P-CCNC: 90 U/L (ref 0–70)
ANION GAP SERPL CALCULATED.3IONS-SCNC: 2 MMOL/L (ref 3–14)
AST SERPL W P-5'-P-CCNC: 47 U/L (ref 0–45)
BILIRUB SERPL-MCNC: 0.7 MG/DL (ref 0.2–1.3)
BUN SERPL-MCNC: 17 MG/DL (ref 7–30)
CALCIUM SERPL-MCNC: 9.3 MG/DL (ref 8.5–10.1)
CHLORIDE SERPL-SCNC: 105 MMOL/L (ref 94–109)
CHOLEST SERPL-MCNC: 179 MG/DL
CO2 SERPL-SCNC: 29 MMOL/L (ref 20–32)
CREAT SERPL-MCNC: 0.94 MG/DL (ref 0.66–1.25)
GFR SERPL CREATININE-BSD FRML MDRD: >90 ML/MIN/{1.73_M2}
GLUCOSE SERPL-MCNC: 138 MG/DL (ref 70–99)
HBA1C MFR BLD: 6.4 % (ref 0–5.6)
HDLC SERPL-MCNC: 28 MG/DL
LDLC SERPL CALC-MCNC: ABNORMAL MG/DL
LDLC SERPL DIRECT ASSAY-MCNC: 52 MG/DL
NONHDLC SERPL-MCNC: 151 MG/DL
POTASSIUM SERPL-SCNC: 4.1 MMOL/L (ref 3.4–5.3)
PROT SERPL-MCNC: 7.9 G/DL (ref 6.8–8.8)
SODIUM SERPL-SCNC: 136 MMOL/L (ref 133–144)
TRIGL SERPL-MCNC: 584 MG/DL
TSH SERPL DL<=0.005 MIU/L-ACNC: 0.64 MU/L (ref 0.4–4)

## 2020-03-07 PROCEDURE — 83721 ASSAY OF BLOOD LIPOPROTEIN: CPT | Mod: 59 | Performed by: FAMILY MEDICINE

## 2020-03-07 PROCEDURE — 80061 LIPID PANEL: CPT | Performed by: FAMILY MEDICINE

## 2020-03-07 PROCEDURE — 80053 COMPREHEN METABOLIC PANEL: CPT | Performed by: FAMILY MEDICINE

## 2020-03-07 PROCEDURE — 83036 HEMOGLOBIN GLYCOSYLATED A1C: CPT | Performed by: FAMILY MEDICINE

## 2020-03-07 PROCEDURE — 36415 COLL VENOUS BLD VENIPUNCTURE: CPT | Performed by: FAMILY MEDICINE

## 2020-03-07 PROCEDURE — 84443 ASSAY THYROID STIM HORMONE: CPT | Performed by: FAMILY MEDICINE

## 2020-03-07 PROCEDURE — 82306 VITAMIN D 25 HYDROXY: CPT | Performed by: FAMILY MEDICINE

## 2020-03-09 LAB — DEPRECATED CALCIDIOL+CALCIFEROL SERPL-MC: 24 UG/L (ref 20–75)

## 2020-03-15 ENCOUNTER — MYC MEDICAL ADVICE (OUTPATIENT)
Dept: FAMILY MEDICINE | Facility: CLINIC | Age: 40
End: 2020-03-15

## 2020-05-18 DIAGNOSIS — I10 BENIGN ESSENTIAL HYPERTENSION: ICD-10-CM

## 2020-05-19 RX ORDER — LISINOPRIL AND HYDROCHLOROTHIAZIDE 20; 25 MG/1; MG/1
TABLET ORAL
Qty: 180 TABLET | Refills: 0 | Status: SHIPPED | OUTPATIENT
Start: 2020-05-19 | End: 2020-08-14

## 2020-05-19 NOTE — TELEPHONE ENCOUNTER
Routing refill request to provider for review/approval because:  Elevated BP    Patricia Cavanaugh RN on 5/19/2020 at 10:06 AM

## 2020-06-23 ENCOUNTER — TELEPHONE (OUTPATIENT)
Dept: FAMILY MEDICINE | Facility: CLINIC | Age: 40
End: 2020-06-23

## 2020-06-23 NOTE — TELEPHONE ENCOUNTER
Summary:    Patient is due/failing the following:   BP CHECK    Reviewed:  [x] CARE EVERYWHERE  [x] LAST OV NOTE INCLUDING ENDO  [x] FYI TAB  [] MYCHART ACTIVE?  [x] LAST PANEL ENCOUNTER  [x] FUTURE APPTS  [x] IMMUNIZATIONS  BP Readings from Last 3 Encounters:   02/14/20 (!) 124/102   10/08/19 138/78   04/15/19 (!) 152/98           Action needed:   Patient needs nurse only appointment for BP check.    Type of outreach:    Phone, left message for patient to call back.                                                                                Emily Ruiz/SHAHBAZ  [place]---[facility]

## 2021-01-15 ENCOUNTER — HEALTH MAINTENANCE LETTER (OUTPATIENT)
Age: 41
End: 2021-01-15

## 2021-07-26 ENCOUNTER — OFFICE VISIT (OUTPATIENT)
Dept: FAMILY MEDICINE | Facility: CLINIC | Age: 41
End: 2021-07-26
Payer: COMMERCIAL

## 2021-07-26 VITALS
OXYGEN SATURATION: 100 % | DIASTOLIC BLOOD PRESSURE: 82 MMHG | SYSTOLIC BLOOD PRESSURE: 125 MMHG | HEART RATE: 72 BPM | WEIGHT: 196 LBS | BODY MASS INDEX: 29.7 KG/M2 | RESPIRATION RATE: 16 BRPM | TEMPERATURE: 97.7 F | HEIGHT: 68 IN

## 2021-07-26 DIAGNOSIS — R73.03 PREDIABETES: ICD-10-CM

## 2021-07-26 DIAGNOSIS — Z11.59 ENCOUNTER FOR HEPATITIS C SCREENING TEST FOR LOW RISK PATIENT: ICD-10-CM

## 2021-07-26 DIAGNOSIS — I10 BENIGN ESSENTIAL HYPERTENSION: Primary | ICD-10-CM

## 2021-07-26 DIAGNOSIS — Z11.4 ENCOUNTER FOR SCREENING FOR HUMAN IMMUNODEFICIENCY VIRUS (HIV): ICD-10-CM

## 2021-07-26 DIAGNOSIS — Z13.220 LIPID SCREENING: ICD-10-CM

## 2021-07-26 DIAGNOSIS — F32.1 MODERATE MAJOR DEPRESSION (H): ICD-10-CM

## 2021-07-26 LAB
ALBUMIN SERPL-MCNC: 4.3 G/DL (ref 3.4–5)
ALP SERPL-CCNC: 80 U/L (ref 40–150)
ALT SERPL W P-5'-P-CCNC: 43 U/L (ref 0–70)
ANION GAP SERPL CALCULATED.3IONS-SCNC: 3 MMOL/L (ref 3–14)
AST SERPL W P-5'-P-CCNC: 37 U/L (ref 0–45)
BILIRUB SERPL-MCNC: 1.1 MG/DL (ref 0.2–1.3)
BUN SERPL-MCNC: 19 MG/DL (ref 7–30)
CALCIUM SERPL-MCNC: 10 MG/DL (ref 8.5–10.1)
CHLORIDE BLD-SCNC: 100 MMOL/L (ref 94–109)
CHOLEST SERPL-MCNC: 202 MG/DL
CO2 SERPL-SCNC: 32 MMOL/L (ref 20–32)
CREAT SERPL-MCNC: 0.99 MG/DL (ref 0.66–1.25)
FASTING STATUS PATIENT QL REPORTED: NO
GFR SERPL CREATININE-BSD FRML MDRD: >90 ML/MIN/1.73M2
GLUCOSE BLD-MCNC: 143 MG/DL (ref 70–99)
HBA1C MFR BLD: 5.6 % (ref 0–5.6)
HCV AB SERPL QL IA: NONREACTIVE
HDLC SERPL-MCNC: 45 MG/DL
HIV 1+2 AB+HIV1 P24 AG SERPL QL IA: NONREACTIVE
LDLC SERPL CALC-MCNC: 112 MG/DL
NONHDLC SERPL-MCNC: 157 MG/DL
POTASSIUM BLD-SCNC: 4 MMOL/L (ref 3.4–5.3)
PROT SERPL-MCNC: 8.1 G/DL (ref 6.8–8.8)
SODIUM SERPL-SCNC: 135 MMOL/L (ref 133–144)
TRIGL SERPL-MCNC: 224 MG/DL

## 2021-07-26 PROCEDURE — 36415 COLL VENOUS BLD VENIPUNCTURE: CPT | Performed by: FAMILY MEDICINE

## 2021-07-26 PROCEDURE — 87389 HIV-1 AG W/HIV-1&-2 AB AG IA: CPT | Performed by: FAMILY MEDICINE

## 2021-07-26 PROCEDURE — 86803 HEPATITIS C AB TEST: CPT | Performed by: FAMILY MEDICINE

## 2021-07-26 PROCEDURE — 80053 COMPREHEN METABOLIC PANEL: CPT | Performed by: FAMILY MEDICINE

## 2021-07-26 PROCEDURE — 99214 OFFICE O/P EST MOD 30 MIN: CPT | Performed by: FAMILY MEDICINE

## 2021-07-26 PROCEDURE — 80061 LIPID PANEL: CPT | Performed by: FAMILY MEDICINE

## 2021-07-26 PROCEDURE — 83036 HEMOGLOBIN GLYCOSYLATED A1C: CPT | Performed by: FAMILY MEDICINE

## 2021-07-26 RX ORDER — LISINOPRIL/HYDROCHLOROTHIAZIDE 10-12.5 MG
1 TABLET ORAL DAILY
Qty: 90 TABLET | Refills: 1 | Status: SHIPPED | OUTPATIENT
Start: 2021-07-26 | End: 2022-01-24

## 2021-07-26 ASSESSMENT — PATIENT HEALTH QUESTIONNAIRE - PHQ9
SUM OF ALL RESPONSES TO PHQ QUESTIONS 1-9: 4
SUM OF ALL RESPONSES TO PHQ QUESTIONS 1-9: 4
10. IF YOU CHECKED OFF ANY PROBLEMS, HOW DIFFICULT HAVE THESE PROBLEMS MADE IT FOR YOU TO DO YOUR WORK, TAKE CARE OF THINGS AT HOME, OR GET ALONG WITH OTHER PEOPLE: SOMEWHAT DIFFICULT

## 2021-07-26 ASSESSMENT — ANXIETY QUESTIONNAIRES
GAD7 TOTAL SCORE: 5
8. IF YOU CHECKED OFF ANY PROBLEMS, HOW DIFFICULT HAVE THESE MADE IT FOR YOU TO DO YOUR WORK, TAKE CARE OF THINGS AT HOME, OR GET ALONG WITH OTHER PEOPLE?: SOMEWHAT DIFFICULT
7. FEELING AFRAID AS IF SOMETHING AWFUL MIGHT HAPPEN: NOT AT ALL
3. WORRYING TOO MUCH ABOUT DIFFERENT THINGS: SEVERAL DAYS
6. BECOMING EASILY ANNOYED OR IRRITABLE: SEVERAL DAYS
4. TROUBLE RELAXING: SEVERAL DAYS
1. FEELING NERVOUS, ANXIOUS, OR ON EDGE: SEVERAL DAYS
7. FEELING AFRAID AS IF SOMETHING AWFUL MIGHT HAPPEN: NOT AT ALL
GAD7 TOTAL SCORE: 5
GAD7 TOTAL SCORE: 5
5. BEING SO RESTLESS THAT IT IS HARD TO SIT STILL: NOT AT ALL
2. NOT BEING ABLE TO STOP OR CONTROL WORRYING: SEVERAL DAYS

## 2021-07-26 ASSESSMENT — MIFFLIN-ST. JEOR: SCORE: 1758.55

## 2021-07-26 NOTE — PROGRESS NOTES
"    Assessment & Plan     Benign essential hypertension  - BP stable. Since he has been taking 1/2tablet will start him on lower dose of lisinopril/HCTZ.   - lisinopril-hydrochlorothiazide (ZESTORETIC) 10-12.5 MG tablet; Take 1 tablet by mouth daily  - Comprehensive metabolic panel (BMP + Alb, Alk Phos, ALT, AST, Total. Bili, TP); Future  - Comprehensive metabolic panel (BMP + Alb, Alk Phos, ALT, AST, Total. Bili, TP)    Prediabetes  - recheck and adjust accordingly   - Hemoglobin A1c; Future  - Hemoglobin A1c    Moderate major depression (H)  - stable without medication.     Encounter for hepatitis C screening test for low risk patient  - Hepatitis C Screen Reflex to HCV RNA Quant and Genotype; Future  - Hepatitis C Screen Reflex to HCV RNA Quant and Genotype    Encounter for screening for human immunodeficiency virus (HIV)  - HIV Antigen Antibody Combo; Future  - HIV Antigen Antibody Combo    Lipid screening  - Lipid panel reflex to direct LDL Non-fasting; Future  - Lipid panel reflex to direct LDL Non-fasting           BMI:   Estimated body mass index is 29.8 kg/m  as calculated from the following:    Height as of this encounter: 1.727 m (5' 8\").    Weight as of this encounter: 88.9 kg (196 lb).   Weight management plan: Discussed healthy diet and exercise guidelines    See Patient Instructions    Return in about 6 months (around 1/26/2022) for medication recheck, in person, .    Gloria Cronin MD  [facility] MEI Avelar is a 41 year old who presents for the following health issues     History of Present Illness       Hypertension: He presents for follow up of hypertension.  He does not check blood pressure  regularly outside of the clinic. Outpatient blood pressures have not been over 140/90. He does not follow a low salt diet.     He eats 2-3 servings of fruits and vegetables daily.He consumes 0 sweetened beverage(s) daily.He exercises with " "enough effort to increase his heart rate 30 to 60 minutes per day.  He exercises with enough effort to increase his heart rate 5 days per week. He is missing 1 dose(s) of medications per week.  He is not taking prescribed medications regularly due to other.       Medication Followup of lisinopril     Taking Medication as prescribed: yes    Side Effects:  None    Medication Helping Symptoms:  yes     Patient here today for follow up of blood pressure. Admits due to the pandemic he missed his 6 month visit. In order to make his medication last he has been taking 1 tablet daily and most recently 1/2 tablet daily.   He has also made some changes to his diet and has lost about 20 lbs.   BP range 120's/80's.     Also notes with his weight loss and exercise his mood is much better.     Wt Readings from Last 4 Encounters:   07/26/21 88.9 kg (196 lb)   02/14/20 98 kg (216 lb)   10/08/19 99.2 kg (218 lb 9.6 oz)   04/15/19 97.1 kg (214 lb)         Review of Systems   Constitutional, HEENT, cardiovascular, pulmonary, GI, , musculoskeletal, neuro, skin, endocrine and psych systems are negative, except as otherwise noted.      Objective    /82 (BP Location: Right arm, Patient Position: Sitting, Cuff Size: Adult Large)   Pulse 72   Temp 97.7  F (36.5  C) (Oral)   Resp 16   Ht 1.727 m (5' 8\")   Wt 88.9 kg (196 lb)   SpO2 100%   BMI 29.80 kg/m    Body mass index is 29.8 kg/m .  Physical Exam   GENERAL: healthy, alert and no distress  RESP: lungs clear to auscultation - no rales, rhonchi or wheezes  CV: regular rate and rhythm, normal S1 S2, no S3 or S4, no murmur, click or rub, no peripheral edema and peripheral pulses strong  ABDOMEN: soft, nontender, no hepatosplenomegaly, no masses and bowel sounds normal  MS: no gross musculoskeletal defects noted, no edema  PSYCH: mentation appears normal, affect normal/bright            Answers for HPI/ROS submitted by the patient on 7/26/2021  If you checked off any problems, " how difficult have these problems made it for you to do your work, take care of things at home, or get along with other people?: Somewhat difficult  PHQ9 TOTAL SCORE: 4  NATALI 7 TOTAL SCORE: 5

## 2021-07-27 ASSESSMENT — PATIENT HEALTH QUESTIONNAIRE - PHQ9: SUM OF ALL RESPONSES TO PHQ QUESTIONS 1-9: 4

## 2021-07-27 ASSESSMENT — ANXIETY QUESTIONNAIRES: GAD7 TOTAL SCORE: 5

## 2021-09-04 ENCOUNTER — HEALTH MAINTENANCE LETTER (OUTPATIENT)
Age: 41
End: 2021-09-04

## 2021-11-08 ENCOUNTER — TELEPHONE (OUTPATIENT)
Dept: SURGERY | Facility: CLINIC | Age: 41
End: 2021-11-08

## 2021-11-08 ENCOUNTER — OFFICE VISIT (OUTPATIENT)
Dept: SURGERY | Facility: CLINIC | Age: 41
End: 2021-11-08
Payer: COMMERCIAL

## 2021-11-08 VITALS
HEART RATE: 77 BPM | HEIGHT: 68 IN | DIASTOLIC BLOOD PRESSURE: 78 MMHG | WEIGHT: 196 LBS | RESPIRATION RATE: 16 BRPM | BODY MASS INDEX: 29.7 KG/M2 | SYSTOLIC BLOOD PRESSURE: 126 MMHG | OXYGEN SATURATION: 100 %

## 2021-11-08 DIAGNOSIS — R22.2 MASS OF SUBCUTANEOUS TISSUE OF BACK: Primary | ICD-10-CM

## 2021-11-08 DIAGNOSIS — Z11.59 ENCOUNTER FOR SCREENING FOR OTHER VIRAL DISEASES: ICD-10-CM

## 2021-11-08 PROCEDURE — 99213 OFFICE O/P EST LOW 20 MIN: CPT | Performed by: SURGERY

## 2021-11-08 ASSESSMENT — MIFFLIN-ST. JEOR: SCORE: 1758.55

## 2021-11-08 NOTE — LETTER
[facility]    [address], [address]  [place], [place] [address]  Phone [phone]  Fax [phone] [address], [address]  [facility] [place] [address]  Phone [phone]  Fax [phone]    [email or web address]   November 8, 2021    Valentino KEO Santos  [address] [address]    We realize with scheduling surgery, one of your first questions is, how much will this cost?  Below we have provided you with the information you will need to receive an estimate for your surgery.    You are scheduled for the following procedure:  Excision back mass      Surgeon:  Dr. Foster      Physician Assistant:  Yes      Please make sure to have your insurance card available at the time of calling.    Surgeon & Physician Assistant charges and facility charges for [facility], [facility] or [facility]:    Consumer Price Line at [phone]   -  It is important to note that there may be a Physician Assistant assisting with your surgery.  Please be sure to mention this when calling for the estimate.      If you prefer, you can also request a price estimate online by completing the secure form at:  [email or web address]    Facility Charges at [facility], [facility] or [facility]:  [facility] at [phone]  [facility] at [phone]  [facility] at [phone] or [phone]    Anesthesiologist Charges:  [facility] at [phone]    [facility] - Nurse Anesthetist Charges:  [facility] at [phone]

## 2021-11-08 NOTE — LETTER
[facility]    [address], [address]  [place], [place] [address]  Phone [phone]  Fax [phone] [address], [address]  [facility] [place] [address]  Phone [phone]  Fax [phone]    [email or web address]         Valentino Santos      You have been scheduled for a COVID-19 testing appointment at [facility].    11-15-21 at 1:00 PM     The clinic is located at:  [address] [place], [place] [id number]    Please wear a mask or face covering to the testing site.      Following your testing, you will be required to self-isolate until your surgery.  If you need a note for your employer due to this, please let us know.

## 2021-11-08 NOTE — TELEPHONE ENCOUNTER
Type of surgery: EXCISION BACK MASS   Location of surgery: [facility]  Date and time of surgery: 11-19-21, 9:10 AM   Surgeon: DR. RASMUSSEN   Pre-Op Appt Date: PATIENT TO SCHEDULE   Post-Op Appt Date: PATIENT TO SCHEDULE    Packet sent out: GIVEN TO PATIENT   Pre-cert/Authorization completed:  Not Applicable  Date: 11-8-21        EXCISION BACK MASS    LOCAL   30 MINS REQ  PA ASSIST RMO  ALW

## 2021-11-08 NOTE — PROGRESS NOTES
HPI:  Valentino presents today for a subcutaneous mass on his mid-back for the past 1-2 weeks. He denies trauma at to the site. He had a debridement of the same area in December of 2018; at the time it appeared to be acutely infected. The area was itchy for a while but he did not notice a lump there until recently. It is not painful.  It's size is stable.    PE:  There were no vitals taken for this visit.  General appearance: well-nourished, no apparent distress  Skin: There is a 2.5 cm subcutaneous mass on the left upper back.  The overlying skin is  slightly irritated and shiny; there is an overlying scar which is thin and attenuated..  It is non-tender.         Plan:  We will schedule excision at his convenience. I've advised that this be done in the OR given the potential for acute inflammation and bleeding.    Kenneth Foster MD    Please route or send letter to:  Primary Care Provider (PCP)

## 2021-11-15 ENCOUNTER — LAB (OUTPATIENT)
Dept: LAB | Facility: CLINIC | Age: 41
End: 2021-11-15
Payer: COMMERCIAL

## 2021-11-15 DIAGNOSIS — Z11.59 ENCOUNTER FOR SCREENING FOR OTHER VIRAL DISEASES: ICD-10-CM

## 2021-11-15 PROCEDURE — U0005 INFEC AGEN DETEC AMPLI PROBE: HCPCS

## 2021-11-15 PROCEDURE — U0003 INFECTIOUS AGENT DETECTION BY NUCLEIC ACID (DNA OR RNA); SEVERE ACUTE RESPIRATORY SYNDROME CORONAVIRUS 2 (SARS-COV-2) (CORONAVIRUS DISEASE [COVID-19]), AMPLIFIED PROBE TECHNIQUE, MAKING USE OF HIGH THROUGHPUT TECHNOLOGIES AS DESCRIBED BY CMS-2020-01-R: HCPCS

## 2021-11-16 LAB — SARS-COV-2 RNA RESP QL NAA+PROBE: NEGATIVE

## 2021-11-19 ENCOUNTER — APPOINTMENT (OUTPATIENT)
Dept: SURGERY | Facility: PHYSICIAN GROUP | Age: 41
End: 2021-11-19
Payer: COMMERCIAL

## 2021-11-19 ENCOUNTER — HOSPITAL ENCOUNTER (OUTPATIENT)
Facility: CLINIC | Age: 41
Discharge: HOME | End: 2021-11-19
Attending: SURGERY | Admitting: SURGERY
Payer: COMMERCIAL

## 2021-11-19 VITALS
TEMPERATURE: 97.2 F | DIASTOLIC BLOOD PRESSURE: 83 MMHG | RESPIRATION RATE: 12 BRPM | HEART RATE: 62 BPM | SYSTOLIC BLOOD PRESSURE: 125 MMHG | HEIGHT: 68 IN | BODY MASS INDEX: 29.37 KG/M2 | OXYGEN SATURATION: 98 % | WEIGHT: 193.8 LBS

## 2021-11-19 DIAGNOSIS — R22.2 MASS OF SUBCUTANEOUS TISSUE OF BACK: ICD-10-CM

## 2021-11-19 PROCEDURE — 272N000001 HC OR GENERAL SUPPLY STERILE: Performed by: SURGERY

## 2021-11-19 PROCEDURE — 250N000009 HC RX 250: Performed by: SURGERY

## 2021-11-19 PROCEDURE — 10060 I&D ABSCESS SIMPLE/SINGLE: CPT | Performed by: SURGERY

## 2021-11-19 PROCEDURE — 360N000075 HC SURGERY LEVEL 2, PER MIN: Performed by: SURGERY

## 2021-11-19 PROCEDURE — 710N000012 HC RECOVERY PHASE 2, PER MINUTE: Performed by: SURGERY

## 2021-11-19 PROCEDURE — 999N000141 HC STATISTIC PRE-PROCEDURE NURSING ASSESSMENT: Performed by: SURGERY

## 2021-11-19 PROCEDURE — 88307 TISSUE EXAM BY PATHOLOGIST: CPT | Mod: TC | Performed by: SURGERY

## 2021-11-19 ASSESSMENT — MIFFLIN-ST. JEOR: SCORE: 1748.57

## 2021-11-19 NOTE — BRIEF OP NOTE
[facility]    Brief Operative Note    Pre-operative diagnosis: Mass of subcutaneous tissue of back [R22.2]  Post-operative diagnosis Infected dermal cyst, back    Procedure: Procedure(s):  EXCISION, MASS, TORSO  Surgeon: Surgeon(s) and Role:     * Kenneth Gracia MD - Primary  Anesthesia: Local   Estimated Blood Loss: Minimal    Drains: None  Specimens:   ID Type Source Tests Collected by Time Destination   1 : Back Mass Tissue Back, Upper SURGICAL PATHOLOGY EXAM Kenneth Gracia MD 11/19/2021  9:37 AM      Findings:   Induraded subcutaneous fat and dermis with fragments of disrupted dermal cyst; debrided and packed open..  Complications: None.  Implants: * No implants in log *

## 2021-11-19 NOTE — OP NOTE
Procedure Date: 11/19/2021    PREOPERATIVE DIAGNOSIS:  Left upper back mass.    POSTOPERATIVE DIAGNOSIS:  Infected or inflamed dermal cyst in the left upper back.    PROCEDURE:  Excisional debridement, left upper back mass, using cold knife and electrocautery.    ANESTHESIA:  Local only.    SURGEON:  Kenneth Foster M.D.    ASSISTANT:  None.    SPECIMENS:  Back mass for routine handling.    COMPLICATIONS:  None.    INDICATIONS FOR PROCEDURE:  Mr. Santos is a healthy 41-year-old gentleman who I met in my office recently who presented with a dermal type cyst in the region of his left shoulder blade, which had been there for at least several years, but was acutely inflamed in the past and more recently had increased in size as well.  At the time of my exam, the site seemed to be irritated or inflamed, but not overtly infected.  He was scheduled for a procedure to excise it today, but several days ago, states that the site spontaneously opened and drained.  At the time of my reevaluation in preop, he had some attenuated skin and granulation tissue over the site, which was still indurated.  I advised proceeding with an excisional debridement and packing of the site for better wound healing.  Risks of the procedure including infection, bleeding, harm to structures, and recurrence of the lesion were reviewed as was the intent to heal by secondary intention.  The patient verbalized understanding of the above and consented to proceed.    FINDINGS:  There was a moderate amount of induration in the subcutaneous fat, but no purulent material was expressed.  Fragments of an organized dermal cyst were retrieved in portions.  The site was irrigated, cauterized and packed open.    DESCRIPTION OF PROCEDURE:  With the patient in a comfortable prone position on the operative table, the left upper back was prepped and draped out with Betadine in the usual fashion.  A timeout was then performed confirming the patient, procedure to be  done as well as drug allergies and site markings.  He required no antibiotics for this clean contaminated case.  We began by infiltrating the site of the lesion with a mixture of 1% lidocaine and 0.5% Marcaine buffered.  Approximately 17 mL was used over the course of the procedure.  Anesthetic effect was tested by pinching with an Adson forceps.  We subsequently used the cut mode on our electrocautery pen to excise an ellipse measuring about 2 x 1 cm.  Just deep to this site, fragments of a dermal cyst were visualized.  These were bluntly debrided free from the surrounding tissues and delivered and passed off for routine exam as a back mass.  Several other small fragments of the cyst wall were retrieved as well.  The site was then copiously irrigated, cauterized and packed open with 1-inch plain gauze.  A dry sterile dressing was applied over this.  The patient tolerated the procedure well and was brought to preop in excellent condition.  All sharps and sponge counts were correct at the conclusion of the case.  Detailed wound care instructions were given to the patient who should return to office within the coming several weeks for wound check.    Kenneth Prince MD        D: 2021   T: 2021   MT: ADRIENNE    Name:     NEETA RONQUILLO  MRN:      [id number]        Account:        [id number]   :      1980           Procedure Date: 2021     Document: [id number]

## 2021-11-19 NOTE — DISCHARGE INSTRUCTIONS
HOME CARE FOLLOWING DEBRIDEMENT  STACEY Howe, JOSH Davies R. O Donnell, J. Shaheen    WOUND CARE:    Dressing changes should be done one time a day as recommended by your surgeon.    Dressing change; 1. Remove outer layer of dressing material  2. Moisten the packing in the wound, either with saline or in the shower (it is ok if soap gets into the packing/wound area). 3. After the packing is adequately moistened, slowly remove the packing material and rinse the wound with saline or allow water from your shower rinse the wound by allowing it to run down into the site (NOT directly hitting the site).  4. Once the wound bed is cleansed, pat the area dry.  5. Repack the wound with dry gauze strip or recommended packing material.  6. Apply over-lay absorbant dry gauze and tape in place.     If you have any questions or concerns about your wound or wound care, or would like further instruction, please contact your surgeon's office.  If you have a complicated wound and have been instructed by a Specialized Wound Care Nurse during your hospitalization, and they have given you contact information to reach them, you may also contact them.    ACTIVITY:  Minimize lifting and stretching of area of debridement and the closest extremity (arm or leg) until further recommended by your surgeon.  If your surgery site is in the abdomen, restrict from overhead reaching and/or stretching.    DIET:  No restrictions.  Increased fluid intake is recommended. While taking pain medications, increase dietary fiber or add a fiber supplementation like Metamucil or Citrucel to help prevent constipation - a possible side effect of pain medications.    DISCOMFORT:  Begin taking pain pills before discomfort is severe.  Take the pain medication with some food, when possible, to minimize side effects.  Intermittent use of ice packs may help.  Expect gradual improvement.    RETURN APPOINTMENT:  As recommended by the surgeon.    Office Phone:  [phone]     CONTACT US IF THE FOLLOWING DEVELOPS:   1. A fever that is above 101     2. If there is a large amount of drainage, bleeding, or swelling.   3. Severe pain that is not relieved by your prescription.   4. Drainage that is thick, cloudy, yellow, green or white.   5. Any other questions not answered by  Frequently Asked Questions  sheet.        FREQUENTLY ASKED QUESTIONS:    Q:  How should my incision look?    A:  Normally your incision will appear slightly swollen with light redness directly along the incision itself as it heals.  It may feel like a bump or ridge as the healing/scarring happens, and over time (3-4 months) this bump or ridge feeling should slowly go away.  In general, clear or pink watery drainage can be normal at first as your incision heals, but should decrease over time.    Q:  How do I know if my incision is infected?  A:  Look at your incision for signs of infection, like redness around the incision spreading to surrounding skin, or drainage of cloudy or foul-smelling drainage.  If you feel warm, check your temperature to see if you are running a fever.    **If any of these things occur, please notify the nurse at our office.  We may need you to come into the office for an incision check.      Q:  How do I take care of my incision?  A:  If you have a dressing in place - Starting the day after surgery, replace the dressing 1-2 times a day until there is no further drainage from the incision.  At that time, a dressing is no longer needed.  Try to minimize tape on the skin if irritation is occurring at the tape sites.  If you have significant irritation from tape on the skin, please call the office to discuss other method of dressing your incision.    Small pieces of tape called  steri-strips  may be present directly overlying your incision; these may be removed 10 days after surgery unless otherwise specified by your surgeon.  If these tapes start to loosen at the  ends, you may trim them back until they fall off or are removed.    A:  If you had  Dermabond  tissue glue used as a dressing (this causes your incision to look shiny with a clear covering over it) - This type of dressing wears off with time and does not require more dressings over the top unless it is draining around the glue as it wears off.  Do not apply ointments or lotions over the incisions until the glue has completely worn off.    Q:  There is a piece of tape or a sticky  lead  still on my skin.  Can I remove this?  A:  Sometimes the sticky  leads  used for monitoring during surgery or for evaluation in the emergency department are not all removed while you are in the hospital.  These sometimes have a tab or metal dot on them.  You can easily remove these on your own, like taking off a band-aid.  If there is a gel substance under the  lead , simply wipe/clean it off with a washcloth or paper towel.      Q:  What can I do to minimize constipation (very hard stools, or lack of stools)?  A:  Stay well hydrated.  Increase your dietary fiber intake or take a fiber supplement -with plenty of water.  Walk around frequently.  You may consider an over-the-counter stool-softener.  Your Pharmacist can assist you with choosing one that is stocked at your pharmacy.  Constipation is also one of the most common side effects of pain medication.  If you are using pain medication, be pro-active and try to PREVENT problems with constipation by taking the steps above BEFORE constipation becomes a problem.    Q:  What do I do if I need more pain medications?  A:  Call the office to receive refills.  Be aware that certain pain meds cannot be called into a pharmacy and actually require a paper prescription.  A change may be made in your pain med as you progress thru your recovery period or if you have side effects to certain meds.    --Pain meds are NOT refilled after 5pm on weekdays, and NOT AT ALL on the weekends, so please look  ahead to prevent problems.      Q:  Why am I having a hard time sleeping now that I am at home?  A:  Many medications you receive while you are in the hospital can impact your sleep for a number of days after your surgery/hospitalization.  Decreased level of activity and naps during the day may also make sleeping at night difficult.  Try to minimize day-time naps, and get up frequently during the day to walk around your home during your recovery time.  Sleep aides may be of some help, but are not recommended for long-term use.      Q:  I am having some back discomfort.  What should I do?  A:  This may be related to certain positioning that was required for your surgery, extended periods of time in bed, or other changes in your overall activity level.  You may try ice, heat, acetaminophen, or ibuprofen to treat this temporarily.  Note that many pain medications have acetaminophen in them and would state this on the prescription bottle.  Be sure not to exceed the maximum of 4000mg per day of acetaminophen.     **If the pain you are having does not resolve, is severe, or is a flare of back pain you have had on other occasions prior to surgery, please contact your primary physician for further recommendations or for an appointment to be examined at their office.    Q:  Why am I having headaches?  A:  Headaches can be caused by many things:  caffeine withdrawal, use of pain meds, dehydration, high blood pressure, lack of sleep, over-activity/exhaustion, flare-up of usual migraine headaches.  If you feel this is related to muscle tension (a band-like feeling around the head, or a pressure at the low-back of the head) you may try ice or heat to this area.  You may need to drink more fluids (try electrolyte drink like Gatorade), rest, or take your usual migraine medications.   **If your headaches do not resolve, worsen, are accompanied by other symptoms, or if your blood pressure is high, please call your primary physician  for recommendation and/or examination.    Q:  I am unable to urinate.  What do I do?  A:  A small percentage of people can have difficulty urinating initially after surgery.  This includes being able to urinate only a very small amount at a time and feeling discomfort or pressure in the very low abdomen.  This is called  urinary retention , and is actually an urgent situation.  Proceed to your nearest Emergency department for evaluation (not an Urgent Care Center).  Sometimes the bladder does not work correctly after certain medications you receive during surgery, or related to certain procedures.  You may need to have a catheter placed until your bladder recovers.  When planning to go to an Emergency department, it may help to call the ER to let them know you are coming in for this problem after a surgery.  This may help you get in quicker to be evaluated.  **If you have symptoms of a urinary tract infection, please contact your primary physician for the proper evaluation and treatment.          If you have other questions, please call the office Monday thru Friday between 8am and 5pm to discuss with the nurse or physician assistant.  #[phone]    There is a surgeon ON CALL on weekday evenings and over the weekend in case of urgent need only, and may be contacted at the same number.    If you are having an emergency, call 911 or proceed to your nearest emergency department.

## 2021-11-22 LAB
PATH REPORT.COMMENTS IMP SPEC: NORMAL
PATH REPORT.COMMENTS IMP SPEC: NORMAL
PATH REPORT.FINAL DX SPEC: NORMAL
PATH REPORT.GROSS SPEC: NORMAL
PATH REPORT.MICROSCOPIC SPEC OTHER STN: NORMAL
PHOTO IMAGE: NORMAL

## 2021-11-22 PROCEDURE — 88307 TISSUE EXAM BY PATHOLOGIST: CPT | Mod: 26 | Performed by: PATHOLOGY

## 2021-12-03 ENCOUNTER — OFFICE VISIT (OUTPATIENT)
Dept: SURGERY | Facility: CLINIC | Age: 41
End: 2021-12-03
Payer: COMMERCIAL

## 2021-12-03 VITALS
WEIGHT: 193 LBS | BODY MASS INDEX: 29.25 KG/M2 | SYSTOLIC BLOOD PRESSURE: 125 MMHG | HEART RATE: 68 BPM | RESPIRATION RATE: 16 BRPM | DIASTOLIC BLOOD PRESSURE: 83 MMHG | HEIGHT: 68 IN | OXYGEN SATURATION: 99 %

## 2021-12-03 DIAGNOSIS — R22.2 MASS OF SUBCUTANEOUS TISSUE OF BACK: Primary | ICD-10-CM

## 2021-12-03 PROCEDURE — 99024 POSTOP FOLLOW-UP VISIT: CPT | Performed by: SURGERY

## 2021-12-03 ASSESSMENT — MIFFLIN-ST. JEOR: SCORE: 1744.94

## 2021-12-03 NOTE — PROGRESS NOTES
Re:  Valentino Santos- 1980    Dear Provider,    I had the pleasure of seeing Valentino today in follow-up for his excisional debridement of an infected back cyst on 11/19/21. The patient tolerated the procedure well. The packing has been removed and he has no concerns about healing thus far.     On exam, the patient's incision is healing well without signs of infection. There is a small area of scab, no cellulitis or fluctuance.    Valentino is doing very well postoperatively. I relayed that all is healing well and that hopefully enough of the cyst was removed at the time of the recent procedure that it does not recur. He will keep an eye on the site and call us if a cyst regrows at the same location in hopes of excising it completely when not inflamed..      Sincerely,         Kenneth Foster MD      Please route or send letter to:  Primary Care Provider (PCP)

## 2021-12-03 NOTE — LETTER
December 3, 2021    RE: Valentino Santos, : 1980      Dear Provider,     I had the pleasure of seeing Valentino today in follow-up for his excisional debridement of an infected back cyst on 21. The patient tolerated the procedure well. The packing has been removed and he has no concerns about healing thus far.      On exam, the patient's incision is healing well without signs of infection. There is a small area of scab, no cellulitis or fluctuance.     Valentino is doing very well postoperatively. I relayed that all is healing well and that hopefully enough of the cyst was removed at the time of the recent procedure that it does not recur. He will keep an eye on the site and call us if a cyst regrows at the same location in hopes of excising it completely when not inflamed..      Sincerely,            Kenneth Foster MD

## 2021-12-20 ENCOUNTER — OFFICE VISIT (OUTPATIENT)
Dept: SURGERY | Facility: CLINIC | Age: 41
End: 2021-12-20
Payer: COMMERCIAL

## 2021-12-20 VITALS
HEIGHT: 68 IN | WEIGHT: 193 LBS | DIASTOLIC BLOOD PRESSURE: 80 MMHG | OXYGEN SATURATION: 98 % | SYSTOLIC BLOOD PRESSURE: 138 MMHG | HEART RATE: 74 BPM | BODY MASS INDEX: 29.25 KG/M2 | RESPIRATION RATE: 16 BRPM

## 2021-12-20 DIAGNOSIS — Z09 SURGICAL FOLLOWUP VISIT: Primary | ICD-10-CM

## 2021-12-20 PROCEDURE — 99024 POSTOP FOLLOW-UP VISIT: CPT | Performed by: SURGERY

## 2021-12-20 PROCEDURE — 17250 CHEM CAUT OF GRANLTJ TISSUE: CPT | Performed by: SURGERY

## 2021-12-20 ASSESSMENT — MIFFLIN-ST. JEOR: SCORE: 1744.94

## 2021-12-20 NOTE — LETTER
2021    RE: Valentino Santos, : 1980      Dear Provider,     I had the pleasure of seeing Valentino today in follow-up for his excisional debridement of an upper back cyst on 21. The patient tolerated the procedure well. He was doing well at his initial post op check but states that last Thursday he had some fullness and sudden drainage from the area which has been weeping a thin bloody fluid since then. He does relay that he has been lifting weights on a bench again recently.     On exam, there is a slight boggy quality to a 4mm area of granulation; I attempted to probe deeper but there was no underlying cavity. I used silver nitrate to cauterize the superficial granulation tissue.      Valentino is doing very well postoperatively. I suspect his recent findings were related to some granulation which has been treated. He was advised to keep the area dressed dry until the spotting stops. He will return if additional issues arise.      Sincerely,            Kenneth Foster MD

## 2021-12-20 NOTE — PROGRESS NOTES
Re:  Valentino Santos- 1980    Dear Provider,    I had the pleasure of seeing Valentino today in follow-up for his excisional debridement of an upper back cyst on 11/19/21. The patient tolerated the procedure well. He was doing well at his initial post op check but states that last Thursday he had some fullness and sudden drainage from the area which has been weeping a thin bloody fluid since then. He does relay that he has been lifting weights on a bench again recently.    On exam, there is a slight boggy quality to a 4mm area of granulation; I attempted to probe deeper but there was no underlying cavity. I used silver nitrate to cauterize the superficial granulation tissue.     Valentino is doing very well postoperatively. I suspect his recent findings were related to some granulation which has been treated. He was advised to keep the area dressed dry until the spotting stops. He will return if additional issues arise.      Sincerely,         Kenneth Foster MD      Please route or send letter to:  Primary Care Provider (PCP)

## 2022-01-21 DIAGNOSIS — I10 BENIGN ESSENTIAL HYPERTENSION: ICD-10-CM

## 2022-01-24 RX ORDER — LISINOPRIL/HYDROCHLOROTHIAZIDE 10-12.5 MG
TABLET ORAL
Qty: 90 TABLET | Refills: 1 | Status: SHIPPED | OUTPATIENT
Start: 2022-01-24

## 2022-02-19 ENCOUNTER — HEALTH MAINTENANCE LETTER (OUTPATIENT)
Age: 42
End: 2022-02-19

## 2022-10-22 ENCOUNTER — HEALTH MAINTENANCE LETTER (OUTPATIENT)
Age: 42
End: 2022-10-22

## 2022-11-12 NOTE — PATIENT INSTRUCTIONS
Start vitamin D 2,000 international unit(s) daily and also use light box  Follow up in clinic in 1 month or sooner if needed    
Attending Only

## 2023-04-01 ENCOUNTER — HEALTH MAINTENANCE LETTER (OUTPATIENT)
Age: 43
End: 2023-04-01

## 2024-02-12 ENCOUNTER — OFFICE VISIT (OUTPATIENT)
Dept: SURGERY | Facility: CLINIC | Age: 44
End: 2024-02-12
Payer: COMMERCIAL

## 2024-02-12 VITALS
WEIGHT: 193 LBS | BODY MASS INDEX: 29.25 KG/M2 | OXYGEN SATURATION: 97 % | SYSTOLIC BLOOD PRESSURE: 164 MMHG | HEART RATE: 84 BPM | HEIGHT: 68 IN | DIASTOLIC BLOOD PRESSURE: 98 MMHG

## 2024-02-12 DIAGNOSIS — D23.5 DERMOID CYST OF SKIN OF BACK: Primary | ICD-10-CM

## 2024-02-12 PROCEDURE — 99213 OFFICE O/P EST LOW 20 MIN: CPT | Performed by: SURGERY

## 2024-02-12 NOTE — LETTER
February 19, 2024      RE:   Valentino Santos 1980      Dear Colleague,    Thank you for referring your patient, Valentino Santos, to [facility]. Please see a copy of my visit note below.    HPI:  Valentino presents today for a dermal cyst on his upper back which acutely became inflamed and drained last Thursday. He did not note a lesion there until it suddenly became swollen. He is known to me from a prior debridement of the same area in 2021    PE:  There were no vitals taken for this visit.  General appearance: well-nourished, no apparent distress  Skin: There is a small opening at the site of his prior incision and drainage site without additional drainage or palpable fluctuance.         Plan:  As it appears that the cyst is adequately drained, we will observe for now. If it seals and recurrently drains, consider irrigation and packing under local. He will let us know what happens over the coming days to weeks.    Valentino to follow up with Primary Care provider regarding elevated blood pressure.     Again, thank you for allowing me to participate in the care of your patient.      Sincerely,      MD PAL Lopez/casandra      D: 2/19/2024  T: 8:07 am

## 2024-02-12 NOTE — PROGRESS NOTES
HPI:  Valentino presents today for a dermal cyst on his upper back which acutely became inflamed and drained last Thursday. He did not note a lesion there until it suddenly became swollen. He is known to me from a prior debridement of the same area in 2021    PE:  There were no vitals taken for this visit.  General appearance: well-nourished, no apparent distress  Skin: There is a small opening at the site of his prior incision and drainage site without additional drainage or palpable fluctuance.         Plan:  As it appears that the cyst is adequately drained, we will observe for now. If it seals and recurrently drains, consider irrigation and packing under local. He will let us know what happens over the coming days to weeks.    Kenneth Foster MD    Please route or send letter to:  Primary Care Provider (PCP)

## 2024-06-01 ENCOUNTER — HEALTH MAINTENANCE LETTER (OUTPATIENT)
Age: 44
End: 2024-06-01

## 2025-06-14 ENCOUNTER — HEALTH MAINTENANCE LETTER (OUTPATIENT)
Age: 45
End: 2025-06-14

## (undated) DEVICE — SOL NACL 0.9% IRRIG 1000ML BOTTLE 2F7124

## (undated) DEVICE — NDL BLUNT 18GA 1" W/O FILTER 305181

## (undated) DEVICE — SUCTION TIP YANKAUER W/O VENT K86

## (undated) DEVICE — GLOVE PROTEXIS BLUE W/NEU-THERA 8.0  2D73EB80

## (undated) DEVICE — PREP CHLORAPREP 26ML TINTED ORANGE  260815

## (undated) DEVICE — PACKING NUGAUZE 1" PLAIN 7633

## (undated) DEVICE — PREP CHLORAPREP 26ML TINTED HI-LITE ORANGE 930815

## (undated) DEVICE — ESU GROUND PAD ADULT W/CORD E7507

## (undated) DEVICE — TUBING SUCTION 12"X1/4" N612

## (undated) DEVICE — GLOVE PROTEXIS POWDER FREE 7.5 ORTHOPEDIC 2D73ET75

## (undated) DEVICE — LINEN TOWEL PACK X10 5473

## (undated) DEVICE — NDL 22GA 1.5"

## (undated) DEVICE — DRAPE LAP W/ARMBOARD 29410

## (undated) DEVICE — ADH SKIN CLOSURE PREMIERPRO EXOFIN 1.0ML 3470

## (undated) DEVICE — LINEN FULL SHEET 5511

## (undated) DEVICE — DECANTER VIAL 2006S

## (undated) DEVICE — NDL 27GA 1.25" 305136

## (undated) DEVICE — PACK MINOR CUSTOM RIDGES SBA32RMRMA

## (undated) DEVICE — LINEN HALF SHEET 5512

## (undated) DEVICE — DRSG TELFA 2X3"

## (undated) DEVICE — BAG CLEAR TRASH 1.3M 39X33" P4040C

## (undated) DEVICE — DRSG GAUZE 4X4" TRAY

## (undated) RX ORDER — LIDOCAINE HYDROCHLORIDE 10 MG/ML
INJECTION, SOLUTION EPIDURAL; INFILTRATION; INTRACAUDAL; PERINEURAL
Status: DISPENSED
Start: 2018-12-19

## (undated) RX ORDER — BUPIVACAINE HYDROCHLORIDE 5 MG/ML
INJECTION, SOLUTION EPIDURAL; INTRACAUDAL
Status: DISPENSED
Start: 2018-12-19